# Patient Record
Sex: MALE | Race: OTHER | HISPANIC OR LATINO
[De-identification: names, ages, dates, MRNs, and addresses within clinical notes are randomized per-mention and may not be internally consistent; named-entity substitution may affect disease eponyms.]

---

## 2017-02-08 ENCOUNTER — APPOINTMENT (OUTPATIENT)
Dept: FAMILY MEDICINE | Facility: CLINIC | Age: 42
End: 2017-02-08

## 2017-02-08 VITALS
DIASTOLIC BLOOD PRESSURE: 81 MMHG | TEMPERATURE: 98.4 F | BODY MASS INDEX: 26.95 KG/M2 | SYSTOLIC BLOOD PRESSURE: 163 MMHG | HEART RATE: 89 BPM | WEIGHT: 210 LBS | HEIGHT: 74 IN | OXYGEN SATURATION: 97 %

## 2017-02-08 DIAGNOSIS — N52.9 MALE ERECTILE DYSFUNCTION, UNSPECIFIED: ICD-10-CM

## 2017-02-17 LAB — HBA1C MFR BLD HPLC: 6.6

## 2017-03-21 ENCOUNTER — EMERGENCY (EMERGENCY)
Facility: HOSPITAL | Age: 42
LOS: 1 days | Discharge: HOME | End: 2017-03-21
Attending: EMERGENCY MEDICINE | Admitting: EMERGENCY MEDICINE
Payer: MEDICARE

## 2017-03-21 VITALS
TEMPERATURE: 98 F | WEIGHT: 210.1 LBS | HEART RATE: 85 BPM | SYSTOLIC BLOOD PRESSURE: 136 MMHG | DIASTOLIC BLOOD PRESSURE: 87 MMHG | RESPIRATION RATE: 16 BRPM

## 2017-03-21 DIAGNOSIS — H53.8 OTHER VISUAL DISTURBANCES: ICD-10-CM

## 2017-03-21 DIAGNOSIS — R51 HEADACHE: ICD-10-CM

## 2017-03-21 DIAGNOSIS — E11.9 TYPE 2 DIABETES MELLITUS WITHOUT COMPLICATIONS: ICD-10-CM

## 2017-03-21 PROCEDURE — 70450 CT HEAD/BRAIN W/O DYE: CPT

## 2017-03-21 PROCEDURE — 99284 EMERGENCY DEPT VISIT MOD MDM: CPT | Mod: 25

## 2017-03-21 PROCEDURE — T1013: CPT

## 2017-03-21 PROCEDURE — 70450 CT HEAD/BRAIN W/O DYE: CPT | Mod: 26

## 2017-03-21 PROCEDURE — 99284 EMERGENCY DEPT VISIT MOD MDM: CPT

## 2017-03-21 PROCEDURE — 82962 GLUCOSE BLOOD TEST: CPT

## 2017-03-21 RX ORDER — ACETAMINOPHEN 500 MG
650 TABLET ORAL ONCE
Qty: 0 | Refills: 0 | Status: COMPLETED | OUTPATIENT
Start: 2017-03-21 | End: 2017-03-21

## 2017-03-21 RX ORDER — IBUPROFEN 200 MG
1 TABLET ORAL
Qty: 15 | Refills: 0 | OUTPATIENT
Start: 2017-03-21

## 2017-03-21 RX ADMIN — Medication 650 MILLIGRAM(S): at 14:51

## 2017-03-21 NOTE — ED STATDOCS - ATTENDING CONTRIBUTION TO CARE
I, Richmond Grover, performed the initial face to face bedside interview with this patient regarding history of present illness, review of symptoms and relevant past medical, social and family history.  I completed an independent physical examination.  I was the initial provider who evaluated this patient. I have signed out the follow up of any pending tests (i.e. labs, radiological studies) to the ACP.  I have communicated the patient’s plan of care and disposition with the ACP.  The history, relevant review of systems, past medical and surgical history, medical decision making, and physical examination was documented by the scribe in my presence and I attest to the accuracy of the documentation.

## 2017-03-21 NOTE — ED STATDOCS - PROGRESS NOTE DETAILS
pt complaining of HA for the past 4 days, states symptoms improved mildly with motrin, denies dizziness/visual changes/neck pain/CP/SOB. Pt denies smoking hx, admits to Zyprexa and Abilify use, and was placed on DM medication "due to those meds side effects."   Gen- NAD Chest- s1s2 Lungs- CTA b.l Abd- soft, NT, ND, Neuro- no neuro deficits pt re-assessed, NAD, results discussed with pt, advised to rest/stay hydrated, continue meds as directed, f.u with pcp/neuro in 2-3 days. if any symptoms worsen or any new symptoms occur, return to ED, pt verbalized understanding.

## 2017-03-21 NOTE — ED ADULT TRIAGE NOTE - CHIEF COMPLAINT QUOTE
patient is awake and oriented times 3, complains of a headache for the past 4 days, last took advil at 830 am, some relief

## 2017-03-21 NOTE — ED STATDOCS - OBJECTIVE STATEMENT
41 y/o male, h/o DM, presents c/o throbbing constant HA x 4 days. Reports gradual onset. Pt also notes he sees "black dots" since onset. Sx have been improving; currently 2/10 intensity. Denies fever, n/v. Denies any inciting trauma/fall. Pt has taken ibuprofen w/ short, temporary relief. Efrain Esqueda  utilized to obtain history.

## 2017-03-21 NOTE — ED STATDOCS - NS ED MD SCRIBE ATTENDING SCRIBE SECTIONS
REVIEW OF SYSTEMS/HISTORY OF PRESENT ILLNESS/DISPOSITION/PHYSICAL EXAM/VITAL SIGNS( Pullset)/HIV/PAST MEDICAL/SURGICAL/SOCIAL HISTORY

## 2017-04-07 ENCOUNTER — APPOINTMENT (OUTPATIENT)
Dept: FAMILY MEDICINE | Facility: CLINIC | Age: 42
End: 2017-04-07

## 2017-04-12 ENCOUNTER — APPOINTMENT (OUTPATIENT)
Dept: FAMILY MEDICINE | Facility: CLINIC | Age: 42
End: 2017-04-12

## 2017-04-12 VITALS
HEART RATE: 76 BPM | WEIGHT: 210 LBS | SYSTOLIC BLOOD PRESSURE: 140 MMHG | BODY MASS INDEX: 26.95 KG/M2 | OXYGEN SATURATION: 98 % | DIASTOLIC BLOOD PRESSURE: 85 MMHG | TEMPERATURE: 98.2 F | HEIGHT: 74 IN

## 2017-04-12 DIAGNOSIS — M79.1 MYALGIA: ICD-10-CM

## 2017-05-08 ENCOUNTER — APPOINTMENT (OUTPATIENT)
Dept: FAMILY MEDICINE | Facility: CLINIC | Age: 42
End: 2017-05-08

## 2017-06-05 ENCOUNTER — RX RENEWAL (OUTPATIENT)
Age: 42
End: 2017-06-05

## 2017-06-13 ENCOUNTER — EMERGENCY (EMERGENCY)
Facility: HOSPITAL | Age: 42
LOS: 1 days | Discharge: HOME | End: 2017-06-13
Attending: EMERGENCY MEDICINE
Payer: MEDICARE

## 2017-06-13 VITALS
WEIGHT: 199.96 LBS | TEMPERATURE: 99 F | HEIGHT: 74 IN | HEART RATE: 90 BPM | OXYGEN SATURATION: 98 % | RESPIRATION RATE: 16 BRPM | SYSTOLIC BLOOD PRESSURE: 132 MMHG | DIASTOLIC BLOOD PRESSURE: 79 MMHG

## 2017-06-13 VITALS
TEMPERATURE: 99 F | RESPIRATION RATE: 18 BRPM | SYSTOLIC BLOOD PRESSURE: 116 MMHG | DIASTOLIC BLOOD PRESSURE: 82 MMHG | HEART RATE: 90 BPM | OXYGEN SATURATION: 97 %

## 2017-06-13 PROCEDURE — 99283 EMERGENCY DEPT VISIT LOW MDM: CPT

## 2017-06-13 PROCEDURE — 99282 EMERGENCY DEPT VISIT SF MDM: CPT

## 2017-06-13 RX ORDER — HYDROCORTISONE 1 %
1 OINTMENT (GRAM) TOPICAL
Qty: 1 | Refills: 0 | OUTPATIENT
Start: 2017-06-13 | End: 2017-06-23

## 2017-06-13 NOTE — ED ADULT TRIAGE NOTE - CHIEF COMPLAINT QUOTE
Pt c/o redness, pain and itching to rash on right arm. States he noticed red valentine on his right arm approx 3 days ago and it slowly got worse.

## 2017-06-13 NOTE — ED STATDOCS - NS ED MD SCRIBE ATTENDING SCRIBE SECTIONS
PAST MEDICAL/SURGICAL/SOCIAL HISTORY/INTAKE ASSESSMENT/SCREENINGS/DISPOSITION/HIV/REVIEW OF SYSTEMS/VITAL SIGNS( Pullset)/PHYSICAL EXAM/HISTORY OF PRESENT ILLNESS

## 2017-06-13 NOTE — ED STATDOCS - CARE PLAN
Principal Discharge DX:	Contact dermatitis  Instructions for follow-up, activity and diet:	Use steroid ointment as prescribed three times a day.  Return immediately to the ER if your symptoms change or worsen.  Otherwise, follow-up with your doctor later this week for re-examination.

## 2017-06-13 NOTE — ED STATDOCS - OBJECTIVE STATEMENT
43 y/o M pt w/ PMHx of bipolar and schizophrenia presents to the ED c/o red rash to R arm and L shoulder x3 days. Pt describes his rash as itching. Pt states that he was working around chemicals while at work the day before his rash began. Pt has been using Hydrocortisone (once a day) since yesterday to no relief. Pt denies fever, chills, throat swelling, or any other complaints.

## 2017-06-13 NOTE — ED STATDOCS - PLAN OF CARE
Use steroid ointment as prescribed three times a day.  Return immediately to the ER if your symptoms change or worsen.  Otherwise, follow-up with your doctor later this week for re-examination.

## 2017-06-13 NOTE — ED STATDOCS - SKIN, MLM
well circumscribed erythematous plaques to R antecubital fossa, back and L side of neck , no secondary drainage. Patches vary in size from a few mm to 4x6 cm well circumscribed erythematous patches and plaques with secondary scaling to R antecubital fossa, back and L side of neck , no secondary signs of infection/ honey crusting. Patches vary in size from a few mm to 4x6 cm

## 2017-06-23 ENCOUNTER — APPOINTMENT (OUTPATIENT)
Dept: FAMILY MEDICINE | Facility: CLINIC | Age: 42
End: 2017-06-23

## 2017-06-23 VITALS
OXYGEN SATURATION: 97 % | HEART RATE: 77 BPM | HEIGHT: 74 IN | DIASTOLIC BLOOD PRESSURE: 85 MMHG | WEIGHT: 210 LBS | BODY MASS INDEX: 26.95 KG/M2 | SYSTOLIC BLOOD PRESSURE: 126 MMHG | TEMPERATURE: 98.4 F

## 2017-06-23 LAB — HBA1C MFR BLD HPLC: 6.9

## 2017-07-12 ENCOUNTER — APPOINTMENT (OUTPATIENT)
Dept: FAMILY MEDICINE | Facility: CLINIC | Age: 42
End: 2017-07-12

## 2017-07-12 VITALS
BODY MASS INDEX: 26.71 KG/M2 | SYSTOLIC BLOOD PRESSURE: 128 MMHG | OXYGEN SATURATION: 99 % | TEMPERATURE: 98 F | DIASTOLIC BLOOD PRESSURE: 83 MMHG | WEIGHT: 208 LBS | HEART RATE: 71 BPM

## 2017-07-13 LAB
25(OH)D3 SERPL-MCNC: 42.4 NG/ML
ALBUMIN SERPL ELPH-MCNC: 4.5 G/DL
ALP BLD-CCNC: 61 U/L
ALT SERPL-CCNC: 17 U/L
ANION GAP SERPL CALC-SCNC: 14 MMOL/L
APPEARANCE: CLEAR
AST SERPL-CCNC: 20 U/L
BASOPHILS # BLD AUTO: 0.02 K/UL
BASOPHILS NFR BLD AUTO: 0.4 %
BILIRUB SERPL-MCNC: 0.3 MG/DL
BILIRUBIN URINE: NEGATIVE
BLOOD URINE: NEGATIVE
BUN SERPL-MCNC: 16 MG/DL
CALCIUM SERPL-MCNC: 10.3 MG/DL
CHLORIDE SERPL-SCNC: 99 MMOL/L
CHOLEST SERPL-MCNC: 205 MG/DL
CHOLEST/HDLC SERPL: 3.5 RATIO
CO2 SERPL-SCNC: 27 MMOL/L
COLOR: YELLOW
CREAT SERPL-MCNC: 1.02 MG/DL
EOSINOPHIL # BLD AUTO: 0.15 K/UL
EOSINOPHIL NFR BLD AUTO: 2.9 %
GLUCOSE QUALITATIVE U: 1000 MG/DL
GLUCOSE SERPL-MCNC: 123 MG/DL
HCT VFR BLD CALC: 47.5 %
HDLC SERPL-MCNC: 59 MG/DL
HGB BLD-MCNC: 16.1 G/DL
IMM GRANULOCYTES NFR BLD AUTO: 0.2 %
KETONES URINE: NEGATIVE
LDLC SERPL CALC-MCNC: 122 MG/DL
LEUKOCYTE ESTERASE URINE: NEGATIVE
LYMPHOCYTES # BLD AUTO: 2.16 K/UL
LYMPHOCYTES NFR BLD AUTO: 42.1 %
MAN DIFF?: NORMAL
MCHC RBC-ENTMCNC: 30.4 PG
MCHC RBC-ENTMCNC: 33.9 GM/DL
MCV RBC AUTO: 89.8 FL
MONOCYTES # BLD AUTO: 0.37 K/UL
MONOCYTES NFR BLD AUTO: 7.2 %
NEUTROPHILS # BLD AUTO: 2.42 K/UL
NEUTROPHILS NFR BLD AUTO: 47.2 %
NITRITE URINE: NEGATIVE
PH URINE: 7.5
PLATELET # BLD AUTO: 313 K/UL
POTASSIUM SERPL-SCNC: 4.3 MMOL/L
PROT SERPL-MCNC: 8.4 G/DL
PROTEIN URINE: NEGATIVE MG/DL
RBC # BLD: 5.29 M/UL
RBC # FLD: 14.1 %
SODIUM SERPL-SCNC: 140 MMOL/L
SPECIFIC GRAVITY URINE: 1.04
TRIGL SERPL-MCNC: 121 MG/DL
TSH SERPL-ACNC: 2.99 UIU/ML
UROBILINOGEN URINE: NORMAL MG/DL
WBC # FLD AUTO: 5.13 K/UL

## 2017-09-25 ENCOUNTER — RX RENEWAL (OUTPATIENT)
Age: 42
End: 2017-09-25

## 2017-10-16 ENCOUNTER — APPOINTMENT (OUTPATIENT)
Dept: FAMILY MEDICINE | Facility: CLINIC | Age: 42
End: 2017-10-16
Payer: MEDICARE

## 2017-10-16 VITALS
HEIGHT: 74 IN | DIASTOLIC BLOOD PRESSURE: 88 MMHG | BODY MASS INDEX: 25.93 KG/M2 | WEIGHT: 202 LBS | TEMPERATURE: 98.1 F | HEART RATE: 73 BPM | SYSTOLIC BLOOD PRESSURE: 143 MMHG | OXYGEN SATURATION: 99 %

## 2017-10-16 PROCEDURE — 83036 HEMOGLOBIN GLYCOSYLATED A1C: CPT | Mod: QW

## 2017-10-16 PROCEDURE — 99213 OFFICE O/P EST LOW 20 MIN: CPT | Mod: 25

## 2017-10-17 LAB — HBA1C MFR BLD HPLC: 6.4

## 2017-11-30 NOTE — ED ADULT TRIAGE NOTE - NS ED NURSE DIRECT TO ROOM YN

## 2018-01-22 ENCOUNTER — RX RENEWAL (OUTPATIENT)
Age: 43
End: 2018-01-22

## 2018-02-15 ENCOUNTER — RX RENEWAL (OUTPATIENT)
Age: 43
End: 2018-02-15

## 2018-03-05 ENCOUNTER — APPOINTMENT (OUTPATIENT)
Dept: FAMILY MEDICINE | Facility: CLINIC | Age: 43
End: 2018-03-05
Payer: MEDICARE

## 2018-03-05 VITALS
HEART RATE: 76 BPM | BODY MASS INDEX: 27.21 KG/M2 | DIASTOLIC BLOOD PRESSURE: 84 MMHG | HEIGHT: 74 IN | SYSTOLIC BLOOD PRESSURE: 150 MMHG | WEIGHT: 212 LBS | TEMPERATURE: 97.9 F | OXYGEN SATURATION: 100 %

## 2018-03-05 PROCEDURE — 83036 HEMOGLOBIN GLYCOSYLATED A1C: CPT | Mod: QW

## 2018-03-05 PROCEDURE — 99214 OFFICE O/P EST MOD 30 MIN: CPT | Mod: 25

## 2018-03-07 LAB — HBA1C MFR BLD HPLC: 7.7

## 2018-05-29 ENCOUNTER — RX RENEWAL (OUTPATIENT)
Age: 43
End: 2018-05-29

## 2018-06-04 ENCOUNTER — APPOINTMENT (OUTPATIENT)
Dept: FAMILY MEDICINE | Facility: CLINIC | Age: 43
End: 2018-06-04
Payer: MEDICARE

## 2018-06-04 VITALS
DIASTOLIC BLOOD PRESSURE: 84 MMHG | HEART RATE: 78 BPM | WEIGHT: 217 LBS | OXYGEN SATURATION: 98 % | BODY MASS INDEX: 27.85 KG/M2 | TEMPERATURE: 98.2 F | SYSTOLIC BLOOD PRESSURE: 133 MMHG | HEIGHT: 74 IN

## 2018-06-04 DIAGNOSIS — Z76.0 ENCOUNTER FOR ISSUE OF REPEAT PRESCRIPTION: ICD-10-CM

## 2018-06-04 PROCEDURE — 99214 OFFICE O/P EST MOD 30 MIN: CPT | Mod: 25

## 2018-06-04 PROCEDURE — 83036 HEMOGLOBIN GLYCOSYLATED A1C: CPT | Mod: QW

## 2018-06-04 NOTE — ASSESSMENT
[FreeTextEntry1] : -DM: D/C Invokana due to afraid of side effects and commercial advertisement.. Continue metformin. HbA1c:8.6%.Start glimepiride 1 mg daily. Risks and benefits d/w pt.\par \par -Schizophrenia; on Abilify and Zyprexa. f/up by Psychiatry.\par \par --CPE:up to date. will schedule appointment for next visit.\par

## 2018-06-04 NOTE — HISTORY OF PRESENT ILLNESS
[FreeTextEntry1] : DM f/up [de-identified] : Pt w/ DM, Schizophrenia, presents today for f/up.\par \par Seen by psychiatry, dr Gutierrez regularly.recenlty change to  MD (Can't recall name). \par reports to feel better.\par \par States was not 100% compliant w/ meds. Afraid of adverse effects.\par

## 2018-06-13 ENCOUNTER — APPOINTMENT (OUTPATIENT)
Dept: FAMILY MEDICINE | Facility: CLINIC | Age: 43
End: 2018-06-13
Payer: MEDICARE

## 2018-06-13 VITALS
TEMPERATURE: 98.3 F | HEART RATE: 79 BPM | WEIGHT: 216 LBS | HEIGHT: 74 IN | BODY MASS INDEX: 27.72 KG/M2 | SYSTOLIC BLOOD PRESSURE: 135 MMHG | OXYGEN SATURATION: 97 % | DIASTOLIC BLOOD PRESSURE: 85 MMHG

## 2018-06-13 PROCEDURE — 82962 GLUCOSE BLOOD TEST: CPT

## 2018-06-13 PROCEDURE — 99214 OFFICE O/P EST MOD 30 MIN: CPT | Mod: 25

## 2018-06-13 NOTE — HISTORY OF PRESENT ILLNESS
[FreeTextEntry8] : weakness in knees. States that yesterday left work earlier due to pain and weakness in both knees.\par denies numbness or tingling in extremities.\par States symptoms are presents since 4 days ago. yesterday symptoms where stronger than previous days. and weakness is permanent.\par \par reports d/c Psychiatric meds since 4 days ago. States now is going to see his psychiatry at [facility]. Insist those are the medication that are ruining him. States feel like zombie all the time. \par Now feel his thoughts are more clear without Psych meds.

## 2018-06-13 NOTE — ASSESSMENT
[FreeTextEntry1] : -DM: D/C Invokana due to afraid of side effects and commercial advertisement.. Continue metformin. HbA1c:8.6%.(6/2018) On glimepiride 1 mg daily. Risks and benefits d/w pt. Glucose: 151\par \par -Schizophrenia; D/C Abilify and Zyprexa. f/up by Psychiatry.today.\par \par --CPE:up to date. will schedule appointment for next visit.\par \par -Weakness both knees: R/O OA. Xray order.\par

## 2018-08-25 ENCOUNTER — EMERGENCY (EMERGENCY)
Facility: HOSPITAL | Age: 43
LOS: 1 days | Discharge: HOME | End: 2018-08-25
Attending: EMERGENCY MEDICINE
Payer: SELF-PAY

## 2018-08-25 VITALS
OXYGEN SATURATION: 99 % | HEART RATE: 69 BPM | TEMPERATURE: 98 F | RESPIRATION RATE: 17 BRPM | SYSTOLIC BLOOD PRESSURE: 129 MMHG | DIASTOLIC BLOOD PRESSURE: 88 MMHG

## 2018-08-25 VITALS
DIASTOLIC BLOOD PRESSURE: 80 MMHG | WEIGHT: 210.1 LBS | HEIGHT: 74 IN | SYSTOLIC BLOOD PRESSURE: 128 MMHG | TEMPERATURE: 99 F | HEART RATE: 83 BPM | OXYGEN SATURATION: 100 % | RESPIRATION RATE: 18 BRPM

## 2018-08-25 DIAGNOSIS — R69 ILLNESS, UNSPECIFIED: ICD-10-CM

## 2018-08-25 DIAGNOSIS — F51.05 INSOMNIA DUE TO OTHER MENTAL DISORDER: ICD-10-CM

## 2018-08-25 DIAGNOSIS — F31.9 BIPOLAR DISORDER, UNSPECIFIED: ICD-10-CM

## 2018-08-25 LAB
ALBUMIN SERPL ELPH-MCNC: 3.9 G/DL — SIGNIFICANT CHANGE UP (ref 3.3–5.2)
ALP SERPL-CCNC: 65 U/L — SIGNIFICANT CHANGE UP (ref 40–120)
ALT FLD-CCNC: 29 U/L — SIGNIFICANT CHANGE UP
AMPHET UR-MCNC: NEGATIVE — SIGNIFICANT CHANGE UP
ANION GAP SERPL CALC-SCNC: 12 MMOL/L — SIGNIFICANT CHANGE UP (ref 5–17)
APAP SERPL-MCNC: <7.5 UG/ML — LOW (ref 10–26)
APPEARANCE UR: CLEAR — SIGNIFICANT CHANGE UP
AST SERPL-CCNC: 24 U/L — SIGNIFICANT CHANGE UP
BARBITURATES UR SCN-MCNC: NEGATIVE — SIGNIFICANT CHANGE UP
BASOPHILS # BLD AUTO: 0 K/UL — SIGNIFICANT CHANGE UP (ref 0–0.2)
BASOPHILS NFR BLD AUTO: 0.6 % — SIGNIFICANT CHANGE UP (ref 0–2)
BENZODIAZ UR-MCNC: NEGATIVE — SIGNIFICANT CHANGE UP
BILIRUB SERPL-MCNC: <0.2 MG/DL — LOW (ref 0.4–2)
BILIRUB UR-MCNC: NEGATIVE — SIGNIFICANT CHANGE UP
BUN SERPL-MCNC: 21 MG/DL — HIGH (ref 8–20)
CALCIUM SERPL-MCNC: 9 MG/DL — SIGNIFICANT CHANGE UP (ref 8.6–10.2)
CHLORIDE SERPL-SCNC: 102 MMOL/L — SIGNIFICANT CHANGE UP (ref 98–107)
CO2 SERPL-SCNC: 25 MMOL/L — SIGNIFICANT CHANGE UP (ref 22–29)
COCAINE METAB.OTHER UR-MCNC: NEGATIVE — SIGNIFICANT CHANGE UP
COLOR SPEC: YELLOW — SIGNIFICANT CHANGE UP
CREAT SERPL-MCNC: 0.76 MG/DL — SIGNIFICANT CHANGE UP (ref 0.5–1.3)
DIFF PNL FLD: NEGATIVE — SIGNIFICANT CHANGE UP
EOSINOPHIL # BLD AUTO: 0.2 K/UL — SIGNIFICANT CHANGE UP (ref 0–0.5)
EOSINOPHIL NFR BLD AUTO: 4.1 % — SIGNIFICANT CHANGE UP (ref 0–5)
EPI CELLS # UR: SIGNIFICANT CHANGE UP
ETHANOL SERPL-MCNC: <10 MG/DL — SIGNIFICANT CHANGE UP
GLUCOSE SERPL-MCNC: 227 MG/DL — HIGH (ref 70–115)
GLUCOSE UR QL: 250 MG/DL
HCT VFR BLD CALC: 40.1 % — LOW (ref 42–52)
HGB BLD-MCNC: 13.6 G/DL — LOW (ref 14–18)
KETONES UR-MCNC: ABNORMAL
LEUKOCYTE ESTERASE UR-ACNC: NEGATIVE — SIGNIFICANT CHANGE UP
LYMPHOCYTES # BLD AUTO: 2.1 K/UL — SIGNIFICANT CHANGE UP (ref 1–4.8)
LYMPHOCYTES # BLD AUTO: 42.5 % — SIGNIFICANT CHANGE UP (ref 20–55)
MCHC RBC-ENTMCNC: 30.4 PG — SIGNIFICANT CHANGE UP (ref 27–31)
MCHC RBC-ENTMCNC: 33.9 G/DL — SIGNIFICANT CHANGE UP (ref 32–36)
MCV RBC AUTO: 89.5 FL — SIGNIFICANT CHANGE UP (ref 80–94)
METHADONE UR-MCNC: NEGATIVE — SIGNIFICANT CHANGE UP
MONOCYTES # BLD AUTO: 0.5 K/UL — SIGNIFICANT CHANGE UP (ref 0–0.8)
MONOCYTES NFR BLD AUTO: 10 % — SIGNIFICANT CHANGE UP (ref 3–10)
NEUTROPHILS # BLD AUTO: 2.1 K/UL — SIGNIFICANT CHANGE UP (ref 1.8–8)
NEUTROPHILS NFR BLD AUTO: 42.6 % — SIGNIFICANT CHANGE UP (ref 37–73)
NITRITE UR-MCNC: NEGATIVE — SIGNIFICANT CHANGE UP
OPIATES UR-MCNC: NEGATIVE — SIGNIFICANT CHANGE UP
PCP SPEC-MCNC: SIGNIFICANT CHANGE UP
PCP UR-MCNC: NEGATIVE — SIGNIFICANT CHANGE UP
PH UR: 8 — SIGNIFICANT CHANGE UP (ref 5–8)
PLATELET # BLD AUTO: 301 K/UL — SIGNIFICANT CHANGE UP (ref 150–400)
POTASSIUM SERPL-MCNC: 3.9 MMOL/L — SIGNIFICANT CHANGE UP (ref 3.5–5.3)
POTASSIUM SERPL-SCNC: 3.9 MMOL/L — SIGNIFICANT CHANGE UP (ref 3.5–5.3)
PROT SERPL-MCNC: 6.9 G/DL — SIGNIFICANT CHANGE UP (ref 6.6–8.7)
PROT UR-MCNC: 15 MG/DL
RBC # BLD: 4.48 M/UL — LOW (ref 4.6–6.2)
RBC # FLD: 13.6 % — SIGNIFICANT CHANGE UP (ref 11–15.6)
RBC CASTS # UR COMP ASSIST: NEGATIVE /HPF — SIGNIFICANT CHANGE UP (ref 0–4)
SALICYLATES SERPL-MCNC: <0.6 MG/DL — LOW (ref 10–20)
SODIUM SERPL-SCNC: 139 MMOL/L — SIGNIFICANT CHANGE UP (ref 135–145)
SP GR SPEC: 1.01 — SIGNIFICANT CHANGE UP (ref 1.01–1.02)
THC UR QL: NEGATIVE — SIGNIFICANT CHANGE UP
TSH SERPL-MCNC: 4.8 UIU/ML — HIGH (ref 0.27–4.2)
UROBILINOGEN FLD QL: NEGATIVE MG/DL — SIGNIFICANT CHANGE UP
WBC # BLD: 4.9 K/UL — SIGNIFICANT CHANGE UP (ref 4.8–10.8)
WBC # FLD AUTO: 4.9 K/UL — SIGNIFICANT CHANGE UP (ref 4.8–10.8)
WBC UR QL: NEGATIVE — SIGNIFICANT CHANGE UP

## 2018-08-25 PROCEDURE — 84443 ASSAY THYROID STIM HORMONE: CPT

## 2018-08-25 PROCEDURE — 93010 ELECTROCARDIOGRAM REPORT: CPT

## 2018-08-25 PROCEDURE — 99053 MED SERV 10PM-8AM 24 HR FAC: CPT

## 2018-08-25 PROCEDURE — 80307 DRUG TEST PRSMV CHEM ANLYZR: CPT

## 2018-08-25 PROCEDURE — 81001 URINALYSIS AUTO W/SCOPE: CPT

## 2018-08-25 PROCEDURE — 99284 EMERGENCY DEPT VISIT MOD MDM: CPT

## 2018-08-25 PROCEDURE — T1013: CPT

## 2018-08-25 PROCEDURE — 85027 COMPLETE CBC AUTOMATED: CPT

## 2018-08-25 PROCEDURE — 36415 COLL VENOUS BLD VENIPUNCTURE: CPT

## 2018-08-25 PROCEDURE — 80053 COMPREHEN METABOLIC PANEL: CPT

## 2018-08-25 PROCEDURE — 82962 GLUCOSE BLOOD TEST: CPT

## 2018-08-25 PROCEDURE — 99284 EMERGENCY DEPT VISIT MOD MDM: CPT | Mod: 25

## 2018-08-25 RX ORDER — METFORMIN HYDROCHLORIDE 850 MG/1
1000 TABLET ORAL ONCE
Qty: 0 | Refills: 0 | Status: COMPLETED | OUTPATIENT
Start: 2018-08-25 | End: 2018-08-25

## 2018-08-25 RX ORDER — OLANZAPINE 15 MG/1
0 TABLET, FILM COATED ORAL
Qty: 0 | Refills: 0 | COMMUNITY

## 2018-08-25 RX ORDER — OLANZAPINE 15 MG/1
1 TABLET, FILM COATED ORAL
Qty: 0 | Refills: 0 | COMMUNITY

## 2018-08-25 RX ORDER — ZOLPIDEM TARTRATE 10 MG/1
1 TABLET ORAL
Qty: 30 | Refills: 0
Start: 2018-08-25

## 2018-08-25 RX ORDER — OLANZAPINE 15 MG/1
5 TABLET, FILM COATED ORAL
Qty: 30 | Refills: 0
Start: 2018-08-25 | End: 2018-09-23

## 2018-08-25 RX ADMIN — METFORMIN HYDROCHLORIDE 1000 MILLIGRAM(S): 850 TABLET ORAL at 08:32

## 2018-08-25 NOTE — ED BEHAVIORAL HEALTH ASSESSMENT NOTE - SUICIDE PROTECTIVE FACTORS
Responsibility to family and others/Identifies reasons for living/Supportive social network or family/High spirituality

## 2018-08-25 NOTE — ED BEHAVIORAL HEALTH ASSESSMENT NOTE - HPI (INCLUDE ILLNESS QUALITY, SEVERITY, DURATION, TIMING, CONTEXT, MODIFYING FACTORS, ASSOCIATED SIGNS AND SYMPTOMS)
Over last month both pt and wife report poor sleep racing thoughts episodes of verbal outbursts "In frustration" but no suicidal or violent behaviors Appetite is good No evidence of psychotic component Compliant with medications generally When more stable he is sometimes not taking them per his wife Patient feels recent increase in Zyprexa from 5 mg to 10 mg made sleep worse He feels exhausted but only dozes briefly Wife corroborates this account In past when admitted to [facility] took Ambien with good effect  No reported drug or alcohol use   Wife was asking for admission to [facility] but confirmed there were no symptoms to warrant involuntary admission  I-Stop query done

## 2018-08-25 NOTE — ED ADULT NURSE NOTE - CHIEF COMPLAINT QUOTE
"I've been having trouble sleeping for 30x days" "I want to see the psychiatrist to help me", c/o difficulty sleeping x30days reports taking olanzapine 10mg and aripiprazole 10mg with no relief. Denies hallucinations denies si/denies hi denies etoh denies drug use denies any pain denies sob denies any additional complaints. MAx4. RR even and unlabored. Cap refill <2sec, in no apparent distress @ this time

## 2018-08-25 NOTE — ED PROVIDER NOTE - MEDICAL DECISION MAKING DETAILS
Pt here with insomnia, r/o metabolic abnormality, requesting psychiatric evaluation, will be moved to

## 2018-08-25 NOTE — ED BEHAVIORAL HEALTH ASSESSMENT NOTE - SUMMARY
Patient with primary complaint of insomnia H/o bipolar disorder on polypharmacy with 2 aytipcal antipsychotics

## 2018-08-25 NOTE — ED ADULT NURSE REASSESSMENT NOTE - NS ED NURSE REASSESS COMMENT FT1
pt assigned room 4, unable to fall asleep, appears restless in room ambulating between common area and room.
Assumed care of patient at 0725.  Patient awake watching television in [facility] area.  Patient pleasant on approach.  Patient denies suicidal or homicidal ideations and psychotic symptoms.  Patient endorses he has been experiencing insomnia for 33 days and sleeps approximately 5 to 10 minutes at a time.  Patient educated about plan of care and pending consult which he verbalized understanding of.

## 2018-08-25 NOTE — ED PROVIDER NOTE - OBJECTIVE STATEMENT
42 y/o M pt with hx of bipolar disorder and schizophrenia presents to ED c/o difficulty sleeping for the past 33 days. Pt has taken Olanzapine 10mg and Aripiprazole 10mg, and is sometimes able to sleep 15-20 minutes then wakes up. Pt states he was seen in [place] and tried ECT with no relief. Denies SI, HI, auditory/visual hallucinations, CP, SOB, n/v/d, abdominal pain, and HA. No further complaints at this time.

## 2018-08-25 NOTE — ED ADULT NURSE NOTE - NSIMPLEMENTINTERV_GEN_ALL_ED
Implemented All Universal Safety Interventions:  [place] to call system. Call bell, personal items and telephone within reach. Instruct patient to call for assistance. Room bathroom lighting operational. Non-slip footwear when patient is off stretcher. Physically safe environment: no spills, clutter or unnecessary equipment. Stretcher in lowest position, wheels locked, appropriate side rails in place.

## 2018-08-25 NOTE — ED ADULT NURSE NOTE - OBJECTIVE STATEMENT
pt states he hasn't been able to sleep for 3 days, very limited but not much. pt with no other complaints.

## 2018-08-25 NOTE — ED BEHAVIORAL HEALTH ASSESSMENT NOTE - DESCRIPTION
DM type 2 disable Born in [place] pleasant talkative polite  Vital Signs Last 24 Hrs  T(C): 36.6 (25 Aug 2018 07:45), Max: 37.1 (25 Aug 2018 01:36)  T(F): 97.9 (25 Aug 2018 07:45), Max: 98.7 (25 Aug 2018 01:36)  HR: 67 (25 Aug 2018 07:45) (67 - 83)  BP: 119/80 (25 Aug 2018 07:45) (109/75 - 128/80)  BP(mean): --  RR: 17 (25 Aug 2018 07:45) (17 - 19)  SpO2: 100% (25 Aug 2018 07:45) (99% - 100%)

## 2018-08-25 NOTE — ED PROVIDER NOTE - PROGRESS NOTE DETAILS
makayla: pt signed out to me; medically cleared was in  for insomnia/bipolar---now cleared by  ok to dc

## 2018-08-25 NOTE — ED ADULT TRIAGE NOTE - CHIEF COMPLAINT QUOTE
"I've been having trouble sleeping for 30x days" "I want to see the psychiatrist to help me", c/o difficulty sleeping x30days reports taking olanzapine 10mg and aripiprazole 10mg with no relief. Denies hallucinations denies si/denies hi denies any pain denies sob denies any additional complaints. MAx4. RR even and unlabored. Cap refill <2sec, in no apparent distress @ this time "I've been having trouble sleeping for 30x days" "I want to see the psychiatrist to help me", c/o difficulty sleeping x30days reports taking olanzapine 10mg and aripiprazole 10mg with no relief. Denies hallucinations denies si/denies hi denies etoh denies drug use denies any pain denies sob denies any additional complaints. MAx4. RR even and unlabored. Cap refill <2sec, in no apparent distress @ this time

## 2018-08-25 NOTE — ED ADULT NURSE NOTE - HPI (INCLUDE ILLNESS QUALITY, SEVERITY, DURATION, TIMING, CONTEXT, MODIFYING FACTORS, ASSOCIATED SIGNS AND SYMPTOMS)
pt received medically cleared by ED attending, pt A&o x4 c/o insomnia for 33 days, pt taking zyprexa & abilify at hs but not reliving insomnia. pt presents with a full affect, calm and cooperative, denies SI, HI, paranoia, or AVH. Pt shows no sx of veena. pt requesting that Dr. Cloud review his medications for improvement of insomnia. Pt maintains a full time job.

## 2018-09-05 ENCOUNTER — APPOINTMENT (OUTPATIENT)
Dept: FAMILY MEDICINE | Facility: CLINIC | Age: 43
End: 2018-09-05
Payer: MEDICARE

## 2018-09-05 VITALS
TEMPERATURE: 98.3 F | OXYGEN SATURATION: 98 % | HEART RATE: 80 BPM | HEIGHT: 74 IN | SYSTOLIC BLOOD PRESSURE: 137 MMHG | BODY MASS INDEX: 27.21 KG/M2 | WEIGHT: 212 LBS | DIASTOLIC BLOOD PRESSURE: 85 MMHG

## 2018-09-05 PROBLEM — E11.9 TYPE 2 DIABETES MELLITUS WITHOUT COMPLICATIONS: Chronic | Status: ACTIVE | Noted: 2018-08-25

## 2018-09-05 LAB — HBA1C MFR BLD HPLC: 8.3

## 2018-09-05 PROCEDURE — G0444 DEPRESSION SCREEN ANNUAL: CPT | Mod: 59

## 2018-09-05 PROCEDURE — G0439: CPT

## 2018-09-05 PROCEDURE — 36415 COLL VENOUS BLD VENIPUNCTURE: CPT

## 2018-09-05 PROCEDURE — 83036 HEMOGLOBIN GLYCOSYLATED A1C: CPT | Mod: QW

## 2018-09-05 RX ORDER — GLIMEPIRIDE 1 MG/1
1 TABLET ORAL DAILY
Qty: 90 | Refills: 0 | Status: DISCONTINUED | COMMUNITY
Start: 2018-03-05 | End: 2018-09-05

## 2018-09-05 NOTE — ASSESSMENT
[FreeTextEntry1] : -CPE: blood and UA done today.\par \par -HIV: screening 2016.\par \par -DM: Not well control. Continue meds.HbA1c :8.3%. Continue metformin. Start januvia. Intolerant to amaryl and refused Invokana.\par \par -Schizophrenia: continue meds and f/up w/ psychiatry Dr Magallon at [facility].\par \par -Dyslipidemia: Lipid profile today. pt refused meds in the past.\par \par -Podiatry: seen 4 months ago\par \par -opthalmology: seen < 1 year ago. \par \par

## 2018-09-05 NOTE — HISTORY OF PRESENT ILLNESS
[FreeTextEntry1] : Annual physical [de-identified] : Pt w/ DM, Schizophrenia, presents today for annual physical.\par \par Pt states that d/c invokana aprox 3 weks , ago, due to commercial advertisement and States  contacts him by phone.Did not tolerate amaryl.\par \par Seen by psychiatry, dr Magallon regularly. \par  \par

## 2018-09-05 NOTE — HEALTH RISK ASSESSMENT
[Good] : ~his/her~  mood as  good [No falls in past year] : Patient reported no falls in the past year [0] : 2) Feeling down, depressed, or hopeless: Not at all (0) [HIV test declined] : HIV test declined [Hepatitis C test declined] : Hepatitis C test declined [None] : None [With Family] : lives with family [On disability] : on disability [] :  [# Of Children ___] : has [unfilled] children [Sexually Active] : sexually active [Feels Safe at Home] : Feels safe at home [Fully functional (bathing, dressing, toileting, transferring, walking, feeding)] : Fully functional (bathing, dressing, toileting, transferring, walking, feeding) [Fully functional (using the telephone, shopping, preparing meals, housekeeping, doing laundry, using] : Fully functional and needs no help or supervision to perform IADLs (using the telephone, shopping, preparing meals, housekeeping, doing laundry, using transportation, managing medications and managing finances) [Smoke Detector] : smoke detector [Seat Belt] :  uses seat belt [Discussed at today's visit] : Advance Directives Discussed at today's visit [Designated Healthcare Proxy] : Designated healthcare proxy [Name: ___] : Health Care Proxy's Name: [unfilled]  [Relationship: ___] : Relationship: [unfilled] [] : No [Change in mental status noted] : No change in mental status noted [Language] : denies difficulty with language [Handling Complex Tasks] : denies difficulty handling complex tasks [Reports changes in hearing] : Reports no changes in hearing [Reports changes in vision] : Reports no changes in vision [Reports changes in dental health] : Reports no changes in dental health [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

## 2018-09-11 LAB
25(OH)D3 SERPL-MCNC: 21.9 NG/ML
ALBUMIN SERPL ELPH-MCNC: 4.3 G/DL
ALP BLD-CCNC: 83 U/L
ALT SERPL-CCNC: 39 U/L
ANION GAP SERPL CALC-SCNC: 15 MMOL/L
APPEARANCE: CLEAR
AST SERPL-CCNC: 24 U/L
BASOPHILS # BLD AUTO: 0.03 K/UL
BASOPHILS NFR BLD AUTO: 0.5 %
BILIRUB SERPL-MCNC: <0.2 MG/DL
BILIRUBIN URINE: NEGATIVE
BLOOD URINE: NEGATIVE
BUN SERPL-MCNC: 18 MG/DL
CALCIUM SERPL-MCNC: 10 MG/DL
CHLORIDE SERPL-SCNC: 97 MMOL/L
CHOLEST SERPL-MCNC: 238 MG/DL
CHOLEST/HDLC SERPL: 5.3 RATIO
CO2 SERPL-SCNC: 24 MMOL/L
COLOR: YELLOW
CREAT SERPL-MCNC: 1.05 MG/DL
CREAT SPEC-SCNC: 79 MG/DL
EOSINOPHIL # BLD AUTO: 0.19 K/UL
EOSINOPHIL NFR BLD AUTO: 3.1 %
GLUCOSE QUALITATIVE U: 1000 MG/DL
GLUCOSE SERPL-MCNC: 299 MG/DL
HCT VFR BLD CALC: 45.5 %
HDLC SERPL-MCNC: 45 MG/DL
HGB BLD-MCNC: 14.6 G/DL
IMM GRANULOCYTES NFR BLD AUTO: 0.3 %
KETONES URINE: ABNORMAL
LDLC SERPL CALC-MCNC: NORMAL
LEUKOCYTE ESTERASE URINE: NEGATIVE
LYMPHOCYTES # BLD AUTO: 2.02 K/UL
LYMPHOCYTES NFR BLD AUTO: 33.3 %
MAN DIFF?: NORMAL
MCHC RBC-ENTMCNC: 29.7 PG
MCHC RBC-ENTMCNC: 32.1 GM/DL
MCV RBC AUTO: 92.7 FL
MICROALBUMIN 24H UR DL<=1MG/L-MCNC: <1.2 MG/DL
MICROALBUMIN/CREAT 24H UR-RTO: NORMAL
MONOCYTES # BLD AUTO: 0.43 K/UL
MONOCYTES NFR BLD AUTO: 7.1 %
NEUTROPHILS # BLD AUTO: 3.37 K/UL
NEUTROPHILS NFR BLD AUTO: 55.7 %
NITRITE URINE: NEGATIVE
PH URINE: 5.5
PLATELET # BLD AUTO: 281 K/UL
POTASSIUM SERPL-SCNC: 4.5 MMOL/L
PROT SERPL-MCNC: 7.6 G/DL
PROTEIN URINE: NEGATIVE MG/DL
RBC # BLD: 4.91 M/UL
RBC # FLD: 14.5 %
SODIUM SERPL-SCNC: 136 MMOL/L
SPECIFIC GRAVITY URINE: 1.03
TRIGL SERPL-MCNC: 491 MG/DL
TSH SERPL-ACNC: 4.65 UIU/ML
UROBILINOGEN URINE: NEGATIVE MG/DL
WBC # FLD AUTO: 6.06 K/UL

## 2018-12-06 ENCOUNTER — APPOINTMENT (OUTPATIENT)
Dept: FAMILY MEDICINE | Facility: CLINIC | Age: 43
End: 2018-12-06
Payer: MEDICARE

## 2018-12-06 VITALS
WEIGHT: 220 LBS | DIASTOLIC BLOOD PRESSURE: 86 MMHG | TEMPERATURE: 98.4 F | HEART RATE: 81 BPM | BODY MASS INDEX: 28.23 KG/M2 | OXYGEN SATURATION: 98 % | HEIGHT: 74 IN | SYSTOLIC BLOOD PRESSURE: 139 MMHG

## 2018-12-06 LAB — HBA1C MFR BLD HPLC: 8.6

## 2018-12-06 PROCEDURE — 99214 OFFICE O/P EST MOD 30 MIN: CPT | Mod: 25

## 2018-12-06 PROCEDURE — 83036 HEMOGLOBIN GLYCOSYLATED A1C: CPT | Mod: QW

## 2018-12-06 NOTE — ASSESSMENT
[FreeTextEntry1] : -CPE: 9/2018\par \par -HIV: screening 2016.\par \par -DM: Not well control. Continue meds.HbA1c :8.6%. Continue metformin. \par on januvia, increase to 100mg daily.. Intolerant to amaryl and Actos, refused Invokana.\par Diabetic education referral.\par \par -Schizophrenia: continue meds and f/up w/ psychiatry Dr Bazan at [facility].\par \par -Dyslipidemia: pt refused meds in the past.\par start atorvastatin 20 mg. Risks and benefits d/w pt.\par \par -Podiatry: seen 4 months ago\par \par -opthalmology: seen < 1 year ago. \par \par

## 2018-12-06 NOTE — HISTORY OF PRESENT ILLNESS
[FreeTextEntry1] : DM f/up [de-identified] : Pt w/ DM, Schizophrenia, presents today for f/up.\par \par Compliant w/ meds, not with diet.\par \par Seen by psychiatry, dr Gutierrez regularly.recently change to Hortensia Lennon MD (Can't recall name). \par reports to feel better.\par \par \par

## 2019-01-24 ENCOUNTER — EMERGENCY (EMERGENCY)
Facility: HOSPITAL | Age: 44
LOS: 1 days | Discharge: HOME | End: 2019-01-24
Attending: EMERGENCY MEDICINE
Payer: MEDICARE

## 2019-01-24 VITALS
HEIGHT: 74 IN | SYSTOLIC BLOOD PRESSURE: 159 MMHG | HEART RATE: 97 BPM | DIASTOLIC BLOOD PRESSURE: 85 MMHG | TEMPERATURE: 98 F | WEIGHT: 214.07 LBS | RESPIRATION RATE: 20 BRPM | OXYGEN SATURATION: 99 %

## 2019-01-24 VITALS
TEMPERATURE: 97 F | RESPIRATION RATE: 16 BRPM | HEART RATE: 65 BPM | SYSTOLIC BLOOD PRESSURE: 119 MMHG | DIASTOLIC BLOOD PRESSURE: 79 MMHG | OXYGEN SATURATION: 98 %

## 2019-01-24 LAB
ACETONE SERPL-MCNC: NEGATIVE — SIGNIFICANT CHANGE UP
ALBUMIN SERPL ELPH-MCNC: 4.4 G/DL — SIGNIFICANT CHANGE UP (ref 3.3–5.2)
ALP SERPL-CCNC: 90 U/L — SIGNIFICANT CHANGE UP (ref 40–120)
ALT FLD-CCNC: 57 U/L — HIGH
ANION GAP SERPL CALC-SCNC: 15 MMOL/L — SIGNIFICANT CHANGE UP (ref 5–17)
APTT BLD: 28.4 SEC — SIGNIFICANT CHANGE UP (ref 27.5–36.3)
AST SERPL-CCNC: 32 U/L — SIGNIFICANT CHANGE UP
BASOPHILS # BLD AUTO: 0 K/UL — SIGNIFICANT CHANGE UP (ref 0–0.2)
BASOPHILS NFR BLD AUTO: 0.3 % — SIGNIFICANT CHANGE UP (ref 0–2)
BILIRUB SERPL-MCNC: 0.3 MG/DL — LOW (ref 0.4–2)
BUN SERPL-MCNC: 14 MG/DL — SIGNIFICANT CHANGE UP (ref 8–20)
CALCIUM SERPL-MCNC: 9.1 MG/DL — SIGNIFICANT CHANGE UP (ref 8.6–10.2)
CHLORIDE SERPL-SCNC: 98 MMOL/L — SIGNIFICANT CHANGE UP (ref 98–107)
CO2 SERPL-SCNC: 25 MMOL/L — SIGNIFICANT CHANGE UP (ref 22–29)
CREAT SERPL-MCNC: 0.86 MG/DL — SIGNIFICANT CHANGE UP (ref 0.5–1.3)
EOSINOPHIL # BLD AUTO: 0.1 K/UL — SIGNIFICANT CHANGE UP (ref 0–0.5)
EOSINOPHIL NFR BLD AUTO: 1.4 % — SIGNIFICANT CHANGE UP (ref 0–6)
GAS PNL BLDV: SIGNIFICANT CHANGE UP
GLUCOSE SERPL-MCNC: 360 MG/DL — HIGH (ref 70–115)
HCO3 BLDV-SCNC: 25 MMOL/L — SIGNIFICANT CHANGE UP (ref 20–26)
HCT VFR BLD CALC: 42.1 % — SIGNIFICANT CHANGE UP (ref 42–52)
HGB BLD-MCNC: 13.9 G/DL — LOW (ref 14–18)
INR BLD: 1.05 RATIO — SIGNIFICANT CHANGE UP (ref 0.88–1.16)
LYMPHOCYTES # BLD AUTO: 2.4 K/UL — SIGNIFICANT CHANGE UP (ref 1–4.8)
LYMPHOCYTES # BLD AUTO: 32.7 % — SIGNIFICANT CHANGE UP (ref 20–55)
MCHC RBC-ENTMCNC: 28.6 PG — SIGNIFICANT CHANGE UP (ref 27–31)
MCHC RBC-ENTMCNC: 33 G/DL — SIGNIFICANT CHANGE UP (ref 32–36)
MCV RBC AUTO: 86.6 FL — SIGNIFICANT CHANGE UP (ref 80–94)
MONOCYTES # BLD AUTO: 0.5 K/UL — SIGNIFICANT CHANGE UP (ref 0–0.8)
MONOCYTES NFR BLD AUTO: 7.1 % — SIGNIFICANT CHANGE UP (ref 3–10)
NEUTROPHILS # BLD AUTO: 4.2 K/UL — SIGNIFICANT CHANGE UP (ref 1.8–8)
NEUTROPHILS NFR BLD AUTO: 58.2 % — SIGNIFICANT CHANGE UP (ref 37–73)
PCO2 BLDV: 54 MMHG — HIGH (ref 35–50)
PH BLDV: 7.32 — SIGNIFICANT CHANGE UP (ref 7.32–7.43)
PLATELET # BLD AUTO: 273 K/UL — SIGNIFICANT CHANGE UP (ref 150–400)
PO2 BLDV: 52 MMHG — HIGH (ref 25–45)
POTASSIUM SERPL-MCNC: 4 MMOL/L — SIGNIFICANT CHANGE UP (ref 3.5–5.3)
POTASSIUM SERPL-SCNC: 4 MMOL/L — SIGNIFICANT CHANGE UP (ref 3.5–5.3)
PROT SERPL-MCNC: 7.4 G/DL — SIGNIFICANT CHANGE UP (ref 6.6–8.7)
PROTHROM AB SERPL-ACNC: 12.1 SEC — SIGNIFICANT CHANGE UP (ref 10–12.9)
RBC # BLD: 4.86 M/UL — SIGNIFICANT CHANGE UP (ref 4.6–6.2)
RBC # FLD: 13.5 % — SIGNIFICANT CHANGE UP (ref 11–15.6)
SAO2 % BLDV: 85 % — SIGNIFICANT CHANGE UP
SODIUM SERPL-SCNC: 138 MMOL/L — SIGNIFICANT CHANGE UP (ref 135–145)
TROPONIN T SERPL-MCNC: <0.01 NG/ML — SIGNIFICANT CHANGE UP (ref 0–0.06)
WBC # BLD: 7.2 K/UL — SIGNIFICANT CHANGE UP (ref 4.8–10.8)
WBC # FLD AUTO: 7.2 K/UL — SIGNIFICANT CHANGE UP (ref 4.8–10.8)

## 2019-01-24 PROCEDURE — 93306 TTE W/DOPPLER COMPLETE: CPT | Mod: 26

## 2019-01-24 PROCEDURE — 93880 EXTRACRANIAL BILAT STUDY: CPT | Mod: 26

## 2019-01-24 PROCEDURE — 99218: CPT

## 2019-01-24 PROCEDURE — 70450 CT HEAD/BRAIN W/O DYE: CPT | Mod: 26

## 2019-01-24 PROCEDURE — 93010 ELECTROCARDIOGRAM REPORT: CPT

## 2019-01-24 RX ORDER — DEXTROSE 50 % IN WATER 50 %
25 SYRINGE (ML) INTRAVENOUS ONCE
Qty: 0 | Refills: 0 | Status: DISCONTINUED | OUTPATIENT
Start: 2019-01-24 | End: 2019-01-29

## 2019-01-24 RX ORDER — OLANZAPINE 15 MG/1
5 TABLET, FILM COATED ORAL AT BEDTIME
Qty: 0 | Refills: 0 | Status: DISCONTINUED | OUTPATIENT
Start: 2019-01-24 | End: 2019-01-29

## 2019-01-24 RX ORDER — SODIUM CHLORIDE 9 MG/ML
1000 INJECTION INTRAMUSCULAR; INTRAVENOUS; SUBCUTANEOUS ONCE
Qty: 0 | Refills: 0 | Status: COMPLETED | OUTPATIENT
Start: 2019-01-24 | End: 2019-01-24

## 2019-01-24 RX ORDER — METFORMIN HYDROCHLORIDE 850 MG/1
1000 TABLET ORAL
Qty: 0 | Refills: 0 | Status: DISCONTINUED | OUTPATIENT
Start: 2019-01-24 | End: 2019-01-29

## 2019-01-24 RX ORDER — SODIUM CHLORIDE 9 MG/ML
1000 INJECTION, SOLUTION INTRAVENOUS
Qty: 0 | Refills: 0 | Status: DISCONTINUED | OUTPATIENT
Start: 2019-01-24 | End: 2019-01-29

## 2019-01-24 RX ORDER — DEXTROSE 50 % IN WATER 50 %
15 SYRINGE (ML) INTRAVENOUS ONCE
Qty: 0 | Refills: 0 | Status: DISCONTINUED | OUTPATIENT
Start: 2019-01-24 | End: 2019-01-29

## 2019-01-24 RX ORDER — DEXTROSE 50 % IN WATER 50 %
12.5 SYRINGE (ML) INTRAVENOUS ONCE
Qty: 0 | Refills: 0 | Status: DISCONTINUED | OUTPATIENT
Start: 2019-01-24 | End: 2019-01-29

## 2019-01-24 RX ORDER — GLUCAGON INJECTION, SOLUTION 0.5 MG/.1ML
1 INJECTION, SOLUTION SUBCUTANEOUS ONCE
Qty: 0 | Refills: 0 | Status: DISCONTINUED | OUTPATIENT
Start: 2019-01-24 | End: 2019-01-29

## 2019-01-24 RX ORDER — ARIPIPRAZOLE 15 MG/1
10 TABLET ORAL DAILY
Qty: 0 | Refills: 0 | Status: DISCONTINUED | OUTPATIENT
Start: 2019-01-25 | End: 2019-01-29

## 2019-01-24 RX ORDER — ATORVASTATIN CALCIUM 80 MG/1
20 TABLET, FILM COATED ORAL AT BEDTIME
Qty: 0 | Refills: 0 | Status: DISCONTINUED | OUTPATIENT
Start: 2019-01-24 | End: 2019-01-29

## 2019-01-24 RX ADMIN — METFORMIN HYDROCHLORIDE 1000 MILLIGRAM(S): 850 TABLET ORAL at 17:18

## 2019-01-24 RX ADMIN — SODIUM CHLORIDE 1000 MILLILITER(S): 9 INJECTION INTRAMUSCULAR; INTRAVENOUS; SUBCUTANEOUS at 12:09

## 2019-01-24 RX ADMIN — OLANZAPINE 5 MILLIGRAM(S): 15 TABLET, FILM COATED ORAL at 21:00

## 2019-01-24 RX ADMIN — ATORVASTATIN CALCIUM 20 MILLIGRAM(S): 80 TABLET, FILM COATED ORAL at 21:00

## 2019-01-24 NOTE — ED ADULT NURSE REASSESSMENT NOTE - NS ED NURSE REASSESS COMMENT FT1
Assumed care of the patient at 1400. Patient A&Ox3. No s/s of distress. States had episode of Left side of face numbness and left hand numbness lasting for about an hour today while driving. Now all symptoms resolved. Patient states he recently started on new medication Januvia for DM. NIH now 0, no deficits noted. Patient ambulatory. Placed on Cardiac monitor. To be taken for echo test (ok per PA to be off monitor for the test). Will continue to monitor.

## 2019-01-24 NOTE — ED CDU PROVIDER INITIAL DAY NOTE - ATTENDING CONTRIBUTION TO CARE
43 yo male pmh dm , bipolar comes to ed with numbness of left upper extremity lasting 1 hours; pt presently asymptomatic;  pt to be admitted to observation for evaluation of tia;  pe neuro awake alert in nad; Alert, lucid, and in no apparent distress. Pt is normocephalic, atraumatic.  Pupils are equal, round,   Lungs clear to ausultation. Heart regular rate and rhythm, normal S1, S2,   Abdomen is soft, nontender,  Non-focal sensory, 5 out of 5 motor strength, no dysmetria, fluent, goal directed speech. CN2 to 12 intact.   dx tia;  mri of head, carotid dopplers and echocardiogram

## 2019-01-24 NOTE — ED ADULT NURSE REASSESSMENT NOTE - NS ED NURSE REASSESS COMMENT FT1
Patient resting comfortably in bed. Awaiting tests results. VSS. In no acute distress. Will continue to monitor.

## 2019-01-24 NOTE — ED STATDOCS - PROGRESS NOTE DETAILS
36 y/o M pt with PMHx bipolar disorder, schizophrenia, DM, HLD presents to the ED c/o sudden onset L hand numbness 8:30 today.  Pt was driving home from work when he suddenly noticed L hand numbness and heaviness in his tongue.  Pt went into Dr. kulkrani's office (PMD) and was told to come into the ED for evaluation.  Pt has never had similar symptoms.  Currently in the ED he notes his L hand numbness is improved, his is able to move his hand, but still feels mild numbness.  Pt will be sent to the Main ED for further treatment and evaluation. Initial orders placed. Code Stroke Initiated.

## 2019-01-24 NOTE — ED ADULT NURSE REASSESSMENT NOTE - NS ED NURSE REASSESS COMMENT FT1
Patient in no acute distress. VSS. Echo performed. Awaits MRI and US carotid tests. No c/o pain, discomfort or numbness noted. Per PA ok to have dinner, diet office called and will bring tray for the patient. Resting comfortably. Will continue to monitor.

## 2019-01-24 NOTE — ED ADULT TRIAGE NOTE - CHIEF COMPLAINT QUOTE
Patient was driving to [employer] this morning left hand went numb, and states he is mouth and tongue "feel heavy".  Symptoms started at 830am, went to his Dr. Romero office and was advised to go right to ED for CT scan.

## 2019-01-24 NOTE — ED ADULT NURSE REASSESSMENT NOTE - NS ED NURSE REASSESS COMMENT FT1
pt A&Ox4;  resting in stretcher. denies any c/o headache, SOB, or weakness. MD. Alex Tai and NILS Sullivan at bedside to  explain plan of care. pt with no questions or concerns at this time. monitoring on-going for any changes.

## 2019-01-24 NOTE — ED PROVIDER NOTE - OBJECTIVE STATEMENT
43yo m h/o schizophrenia and diabetes p/w left arm numbness. pt was driving at 830 am had numbness and weakness in left arm and numbness in left side of tongue lasting 1 hour and now completely resolved. denies chest pain, shortness of breath or other complaints.

## 2019-01-24 NOTE — ED CDU PROVIDER INITIAL DAY NOTE - MEDICAL DECISION MAKING DETAILS
43yo male complaining of left arm numbness. CT head negative for hemorrhage, pts symptoms have resolved. Pt to get MRI head, ECHO, and carotid doppler.

## 2019-01-24 NOTE — ED ADULT NURSE NOTE - CHIEF COMPLAINT QUOTE
Patient was driving to [employer] this morning left hand went numb, and states he is mouth and tongue "feel heavy".  Symptoms started at 830am, went to his Dr. Romero office and was advised to go right to ED for CT scan.

## 2019-01-24 NOTE — ED ADULT NURSE NOTE - NSIMPLEMENTINTERV_GEN_ALL_ED
Implemented All Universal Safety Interventions:  [place]anna to call system. Call bell, personal items and telephone within reach. Instruct patient to call for assistance. Room bathroom lighting operational. Non-slip footwear when patient is off stretcher. Physically safe environment: no spills, clutter or unnecessary equipment. Stretcher in lowest position, wheels locked, appropriate side rails in place.

## 2019-01-24 NOTE — ED PROVIDER NOTE - MEDICAL DECISION MAKING DETAILS
43yo M with 1 hours numbness/weakness to left hand, now resolved, NIH stroke score 0, ct head neg, will obs for MRI/tia work up

## 2019-01-24 NOTE — ED CDU PROVIDER INITIAL DAY NOTE - OBJECTIVE STATEMENT
45yo m h/o schizophrenia and diabetes p/w left arm numbness. pt was driving at 830 am had numbness and weakness in left arm and numbness in left side of tongue lasting 1 hour and now completely resolved. denies chest pain, shortness of breath or other complaints.  Pt reports this has never happened before. Pt denies CP/SOB, fever/chills, blurry vision, nausea/vomiting.

## 2019-01-24 NOTE — ED PROVIDER NOTE - ATTENDING CONTRIBUTION TO CARE
44 year old man hx of DM c/o sudden onset of left arm numbness while driving at 8:30 this morning.  Patient seen by intake doctor and code stroke called.  Currently on assessment of patient is critical care he is asymptomatic.  NIH score of zero.  CT negative for acute pathology.  Patient serum glucose improved with IV fluids, BP stable.  Patient admitted to observation to be worked up for TIA.

## 2019-01-24 NOTE — ED PROVIDER NOTE - NS ED ATTENDING STATEMENT MOD
cure appendicitis The patient was treated with antibiotics and underwent laparoscopic appendectomy with removal of the appendix. I have personally seen and examined this patient.  I have fully participated in the care of this patient. I have reviewed all pertinent clinical information, including history, physical exam, plan and the Resident’s note and agree except as noted.

## 2019-01-24 NOTE — ED ADULT NURSE REASSESSMENT NOTE - NS ED NURSE REASSESS COMMENT FT1
received pt from previous Rn Deborah Cerrato. pt resting in stretcher,  no signs of neuro deficits noted. NILS Adames at bedside to explain Plan of care. remains afebrile VSS. bed in lowest position,call bell within reach and safety maintained. awaiting MRI results.

## 2019-01-25 ENCOUNTER — EMERGENCY (EMERGENCY)
Facility: HOSPITAL | Age: 44
LOS: 1 days | Discharge: HOME | End: 2019-01-25
Attending: EMERGENCY MEDICINE
Payer: MEDICARE

## 2019-01-25 VITALS
SYSTOLIC BLOOD PRESSURE: 153 MMHG | DIASTOLIC BLOOD PRESSURE: 84 MMHG | HEIGHT: 74 IN | WEIGHT: 220.02 LBS | TEMPERATURE: 98 F | HEART RATE: 96 BPM | OXYGEN SATURATION: 99 % | RESPIRATION RATE: 18 BRPM

## 2019-01-25 LAB
ALBUMIN SERPL ELPH-MCNC: 4.5 G/DL — SIGNIFICANT CHANGE UP (ref 3.3–5.2)
ALP SERPL-CCNC: 96 U/L — SIGNIFICANT CHANGE UP (ref 40–120)
ALT FLD-CCNC: 49 U/L — HIGH
ANION GAP SERPL CALC-SCNC: 12 MMOL/L — SIGNIFICANT CHANGE UP (ref 5–17)
AST SERPL-CCNC: 21 U/L — SIGNIFICANT CHANGE UP
BASOPHILS # BLD AUTO: 0 K/UL — SIGNIFICANT CHANGE UP (ref 0–0.2)
BASOPHILS NFR BLD AUTO: 0.3 % — SIGNIFICANT CHANGE UP (ref 0–2)
BILIRUB SERPL-MCNC: 0.3 MG/DL — LOW (ref 0.4–2)
BUN SERPL-MCNC: 13 MG/DL — SIGNIFICANT CHANGE UP (ref 8–20)
CALCIUM SERPL-MCNC: 9.7 MG/DL — SIGNIFICANT CHANGE UP (ref 8.6–10.2)
CHLORIDE SERPL-SCNC: 97 MMOL/L — LOW (ref 98–107)
CO2 SERPL-SCNC: 25 MMOL/L — SIGNIFICANT CHANGE UP (ref 22–29)
CREAT SERPL-MCNC: 0.81 MG/DL — SIGNIFICANT CHANGE UP (ref 0.5–1.3)
EOSINOPHIL # BLD AUTO: 0.1 K/UL — SIGNIFICANT CHANGE UP (ref 0–0.5)
EOSINOPHIL NFR BLD AUTO: 2.3 % — SIGNIFICANT CHANGE UP (ref 0–5)
GLUCOSE SERPL-MCNC: 330 MG/DL — HIGH (ref 70–115)
HBA1C BLD-MCNC: 9.8 % — HIGH (ref 4–5.6)
HCT VFR BLD CALC: 44 % — SIGNIFICANT CHANGE UP (ref 42–52)
HGB BLD-MCNC: 15.1 G/DL — SIGNIFICANT CHANGE UP (ref 14–18)
LYMPHOCYTES # BLD AUTO: 2.4 K/UL — SIGNIFICANT CHANGE UP (ref 1–4.8)
LYMPHOCYTES # BLD AUTO: 38.9 % — SIGNIFICANT CHANGE UP (ref 20–55)
MCHC RBC-ENTMCNC: 30 PG — SIGNIFICANT CHANGE UP (ref 27–31)
MCHC RBC-ENTMCNC: 34.3 G/DL — SIGNIFICANT CHANGE UP (ref 32–36)
MCV RBC AUTO: 87.3 FL — SIGNIFICANT CHANGE UP (ref 80–94)
MONOCYTES # BLD AUTO: 0.5 K/UL — SIGNIFICANT CHANGE UP (ref 0–0.8)
MONOCYTES NFR BLD AUTO: 8.1 % — SIGNIFICANT CHANGE UP (ref 3–10)
NEUTROPHILS # BLD AUTO: 3 K/UL — SIGNIFICANT CHANGE UP (ref 1.8–8)
NEUTROPHILS NFR BLD AUTO: 50.2 % — SIGNIFICANT CHANGE UP (ref 37–73)
PLATELET # BLD AUTO: 300 K/UL — SIGNIFICANT CHANGE UP (ref 150–400)
POTASSIUM SERPL-MCNC: 4.2 MMOL/L — SIGNIFICANT CHANGE UP (ref 3.5–5.3)
POTASSIUM SERPL-SCNC: 4.2 MMOL/L — SIGNIFICANT CHANGE UP (ref 3.5–5.3)
PROT SERPL-MCNC: 7.9 G/DL — SIGNIFICANT CHANGE UP (ref 6.6–8.7)
RBC # BLD: 5.04 M/UL — SIGNIFICANT CHANGE UP (ref 4.6–6.2)
RBC # FLD: 13.2 % — SIGNIFICANT CHANGE UP (ref 11–15.6)
SODIUM SERPL-SCNC: 134 MMOL/L — LOW (ref 135–145)
TROPONIN T SERPL-MCNC: <0.01 NG/ML — SIGNIFICANT CHANGE UP (ref 0–0.06)
WBC # BLD: 6.1 K/UL — SIGNIFICANT CHANGE UP (ref 4.8–10.8)
WBC # FLD AUTO: 6.1 K/UL — SIGNIFICANT CHANGE UP (ref 4.8–10.8)

## 2019-01-25 PROCEDURE — 82962 GLUCOSE BLOOD TEST: CPT

## 2019-01-25 PROCEDURE — 93005 ELECTROCARDIOGRAM TRACING: CPT

## 2019-01-25 PROCEDURE — 93010 ELECTROCARDIOGRAM REPORT: CPT

## 2019-01-25 PROCEDURE — 36415 COLL VENOUS BLD VENIPUNCTURE: CPT

## 2019-01-25 PROCEDURE — G0378: CPT

## 2019-01-25 PROCEDURE — 99284 EMERGENCY DEPT VISIT MOD MDM: CPT

## 2019-01-25 PROCEDURE — 83036 HEMOGLOBIN GLYCOSYLATED A1C: CPT

## 2019-01-25 PROCEDURE — 70450 CT HEAD/BRAIN W/O DYE: CPT

## 2019-01-25 PROCEDURE — 99217: CPT

## 2019-01-25 PROCEDURE — 85027 COMPLETE CBC AUTOMATED: CPT

## 2019-01-25 PROCEDURE — 85730 THROMBOPLASTIN TIME PARTIAL: CPT

## 2019-01-25 PROCEDURE — T1013: CPT

## 2019-01-25 PROCEDURE — 82009 KETONE BODYS QUAL: CPT

## 2019-01-25 PROCEDURE — 99285 EMERGENCY DEPT VISIT HI MDM: CPT | Mod: 25

## 2019-01-25 PROCEDURE — 85610 PROTHROMBIN TIME: CPT

## 2019-01-25 PROCEDURE — 84484 ASSAY OF TROPONIN QUANT: CPT

## 2019-01-25 PROCEDURE — 99283 EMERGENCY DEPT VISIT LOW MDM: CPT

## 2019-01-25 PROCEDURE — 93880 EXTRACRANIAL BILAT STUDY: CPT

## 2019-01-25 PROCEDURE — 80053 COMPREHEN METABOLIC PANEL: CPT

## 2019-01-25 PROCEDURE — 93306 TTE W/DOPPLER COMPLETE: CPT

## 2019-01-25 PROCEDURE — 82803 BLOOD GASES ANY COMBINATION: CPT

## 2019-01-25 RX ORDER — ACETAMINOPHEN 500 MG
650 TABLET ORAL EVERY 6 HOURS
Qty: 0 | Refills: 0 | Status: DISCONTINUED | OUTPATIENT
Start: 2019-01-25 | End: 2019-01-29

## 2019-01-25 RX ADMIN — Medication 650 MILLIGRAM(S): at 02:16

## 2019-01-25 NOTE — ED ADULT NURSE REASSESSMENT NOTE - NS ED NURSE REASSESS COMMENT FT1
pt sleeping in stretcher, no apparent distress noted. bed in lowest position and safety maintained. pt with complaints to S/L headache. NILS Sullivan made aware and per PA will place orders for Tylenol.  bed in lowest position and safety maintained.

## 2019-01-25 NOTE — ED ADULT NURSE REASSESSMENT NOTE - NS ED NURSE REASSESS COMMENT FT1
pt stated he had MRI already. no results at this time. Spoke with MRI tech; per tech MRI was not obtain and pt will have MRI at 8am this morning. Zacarias Sullivan made aware.

## 2019-01-25 NOTE — ED CDU PROVIDER SUBSEQUENT DAY NOTE - ATTENDING CONTRIBUTION TO CARE
seen with   acp  symptoms have resolved completely awaiting results of mri  PE is unremarkable  Agree with acps assessment and dispo.

## 2019-01-25 NOTE — ED STATDOCS - PROGRESS NOTE DETAILS
PA NOTE: PT evaluated by intake physician. HPI/PE/ROS as noted above. Will follow up plan per intake physician. Neg trop, no objective neuro findings noted, NIH score 0, pt to follow up with neurology for outpt MRI and PCP.

## 2019-01-25 NOTE — ED CDU PROVIDER SUBSEQUENT DAY NOTE - PROGRESS NOTE DETAILS
PT is requesting to leave at this time, stating that the blood work and ct scan is negative and does not feel that the MRI is necessary and does not want to wait. I had a lengthy discussion with patient, and the patient wishes to leave at this time. The patient understands that he/she is leaving against medical advice despite the risk of missing a potential serious diagnosis which may lead to injury, disability and/or death. I discussed with the patient which tests would need to be performed and what type of monitoring would be necessary for the patient as well. I was unable to convince the patient to stay for further work-up. The patient is alert and oriented and demonstrates competence in making medical decisions.

## 2019-01-25 NOTE — ED CDU PROVIDER SUBSEQUENT DAY NOTE - HISTORY
45yo m h/o schizophrenia and diabetes p/w left arm numbness. pt was driving at 830 am had numbness and weakness in left arm and numbness in left side of tongue lasting 1 hour and now completely resolved. denies chest pain, shortness of breath or other complaints.

## 2019-01-25 NOTE — ED ADULT TRIAGE NOTE - CHIEF COMPLAINT QUOTE
I got a feeling of pain on the lt side of my head my lt started to twitch it stopped now.  and the lt hand sleeping in my lt hand it is comiting and going started yesterday it is still happening

## 2019-01-25 NOTE — ED STATDOCS - PHYSICAL EXAMINATION
Gen: No acute distress, non toxic  HEENT: Mucous membranes moist, pink conjunctivae, visual acuity intact left eye, EOMI and PEERL  CV: RRR  Resp: CTAB, normal rate and effort  GI: Abdomen soft, NT, ND. No rebound, no guarding  Neuro: A&O x 3, moving all 4 extremities, normal strength and sensation 5/5 in UE and LE b/l including left hand

## 2019-01-25 NOTE — ED ADULT NURSE REASSESSMENT NOTE - NS ED NURSE REASSESS COMMENT FT1
pt A&OX4.Pt stated he wants to leave and does not want to take MRI. pt educated on importance of not leaving AMA, and having test done.  pt requesting to leave hospital. NILS Sullivan made aware. per PA will come to pt's bedside.

## 2019-01-25 NOTE — ED STATDOCS - NS ED ROS FT
ROS: No fever/chills.  No chest painNo SOB/cough/. No abdominal pain, N/V/D,No dysuria/frequency.   +headache. No Dizziness.    No rashes   +numbness left hand, + left decreased vision.

## 2019-01-25 NOTE — ED CDU PROVIDER DISPOSITION NOTE - ATTENDING CONTRIBUTION TO CARE
seen with acp neuro workup complete no evidence of stroke  no evidence of weakness  will d/c patient   Agree with acps assessment and disposition

## 2019-01-25 NOTE — ED STATDOCS - MEDICAL DECISION MAKING DETAILS
No objective neurological deficits; signed out AMA yesterday. Will check labs and EKG; possible CDU for MRI given only diagnostic missing from workup and f/u with neuro. NIHSS currently zero.

## 2019-01-25 NOTE — ED STATDOCS - ATTENDING CONTRIBUTION TO CARE
Eneida: I performed a face to face bedside interview with patient regarding history of present illness, review of symptoms and past medical history. I completed an independent physical exam and ordered tests/medications as needed.  I have discussed patient's plan of care with advanced care provider. The advanced care provider assisted in  executing the discussed plan. I was available for any questions or issues that may have arose during the execution of the plan of care.

## 2019-01-25 NOTE — ED CDU PROVIDER DISPOSITION NOTE - CLINICAL COURSE
43 y/o male with hx of schizo and dm present to the ed with left arm numbness, symptoms lasted a short while and subsided completely on presentation. Echo showed no abnormalities, as well as carotid u/s and ct scan. Pt was scheduled to have MRI but pt refused and signed out AMA after lengthy discussion.

## 2019-02-01 ENCOUNTER — APPOINTMENT (OUTPATIENT)
Dept: FAMILY MEDICINE | Facility: CLINIC | Age: 44
End: 2019-02-01
Payer: MEDICARE

## 2019-02-01 VITALS
BODY MASS INDEX: 27.46 KG/M2 | TEMPERATURE: 97.6 F | WEIGHT: 214 LBS | OXYGEN SATURATION: 97 % | SYSTOLIC BLOOD PRESSURE: 136 MMHG | HEART RATE: 93 BPM | HEIGHT: 74 IN | DIASTOLIC BLOOD PRESSURE: 92 MMHG

## 2019-02-01 LAB — GLUCOSE BLDC GLUCOMTR-MCNC: 308

## 2019-02-01 PROCEDURE — 99214 OFFICE O/P EST MOD 30 MIN: CPT | Mod: 25

## 2019-02-01 PROCEDURE — 82962 GLUCOSE BLOOD TEST: CPT

## 2019-02-01 RX ORDER — SITAGLIPTIN 100 MG/1
100 TABLET, FILM COATED ORAL DAILY
Qty: 90 | Refills: 1 | Status: DISCONTINUED | COMMUNITY
Start: 2018-09-05 | End: 2019-02-01

## 2019-02-01 NOTE — HISTORY OF PRESENT ILLNESS
[FreeTextEntry1] : ER f/up [de-identified] : Pt w/ DM, Schizophrenia, presents today for f/up after seen in ED at [facility] on 1/24/19.\par Pt was driving to [employer] in the AM whit numbness in left hand went to ED for evaluation.\par Pt had blood tests, EKG, CT head and Carotid Doppler: wnl.\par HbA1c done in ED: 9.8%\par \par Compliant w/ meds, not with diet.\par \par Seen by psychiatry, dr Gutierrez regularly.recently change to Hortensia Lennon MD (Can't recall name). \par reports to feel better.\par \par \par

## 2019-02-01 NOTE — ASSESSMENT
[FreeTextEntry1] : PT presents today post ED evaluation for left hand numbness to R/O TIA-Stroke:\par \par -S/P ED eval on 1/24/19:\par CT head, Carotid Doppler, EKG and Blood tests: wnl\par \par -CPE: 9/2018\par \par -HIV: screening 2016.\par \par -DM:Uncontrolled.\par HbA1c :9.8%. \par On metformin 1000mg bid\par Multiple intolerance to meds: januvia,amaryl and Actos.Refused Invokana, after TV commercial.\par Advise to reassume Invokana untill seen by Endocrinology.\par Endocrinology referral.\par \par -Schizophrenia: continue meds.\par  f/up w/ psychiatry Dr Bazan at [facility].\par \par -Dyslipidemia: pt refused meds in the past.\par D/C atorvastatin 20 mg. Pt prefers brand name Lipitor.\par  Risks and benefits d/w pt.\par \par -Podiatry: seen 4 months ago\par \par -opthalmology: seen < 1 year ago. \par \par

## 2019-02-01 NOTE — HEALTH RISK ASSESSMENT
[No falls in past year] : Patient reported no falls in the past year [0] : 1) Little interest or pleasure doing things: Not at all (0) [Intercurrent ED visits] : went to ED [] : No [de-identified] : FAUZIA

## 2019-02-06 ENCOUNTER — RX RENEWAL (OUTPATIENT)
Age: 44
End: 2019-02-06

## 2019-03-06 ENCOUNTER — APPOINTMENT (OUTPATIENT)
Dept: FAMILY MEDICINE | Facility: CLINIC | Age: 44
End: 2019-03-06
Payer: MEDICARE

## 2019-03-06 VITALS
BODY MASS INDEX: 26.69 KG/M2 | OXYGEN SATURATION: 98 % | HEART RATE: 96 BPM | SYSTOLIC BLOOD PRESSURE: 125 MMHG | TEMPERATURE: 98.6 F | HEIGHT: 74 IN | DIASTOLIC BLOOD PRESSURE: 88 MMHG | WEIGHT: 208 LBS

## 2019-03-06 LAB
GLUCOSE BLDC GLUCOMTR-MCNC: 321
HBA1C MFR BLD HPLC: 8.8

## 2019-03-06 PROCEDURE — 82962 GLUCOSE BLOOD TEST: CPT

## 2019-03-06 PROCEDURE — 83036 HEMOGLOBIN GLYCOSYLATED A1C: CPT | Mod: QW

## 2019-03-06 PROCEDURE — 99213 OFFICE O/P EST LOW 20 MIN: CPT | Mod: 25

## 2019-03-06 RX ORDER — CANAGLIFLOZIN 100 MG/1
100 TABLET, FILM COATED ORAL
Qty: 90 | Refills: 1 | Status: DISCONTINUED | COMMUNITY
Start: 2019-02-01 | End: 2019-03-06

## 2019-03-06 NOTE — HISTORY OF PRESENT ILLNESS
[FreeTextEntry1] : DM f/up [de-identified] : Pt w/ Uncontrolled DM, Schizophrenia, presents today for f/up in DM after recently started on Farxiga on 2/1/19. States is tolerating very well med and feeling better.\par Glucose at home this AM: 140\par \par HbA1c done in ED 1/24/19: 9.8%\par \par Compliant w/ meds, not with diet.\par \par Seen by psychiatry, dr Hortensia GALLARDO. \par reports to feel better.\par \par \par

## 2019-03-06 NOTE — ASSESSMENT
[FreeTextEntry1] : PT presents today for f/up in uncontrolled DM, after strart Farxiga 1 month ago:\par \par -S/P ED eval on 1/24/19:\par CT head, Carotid Doppler, EKG and Blood tests: wnl\par \par -CPE: 9/2018\par \par -HIV: screening 2016.\par \par -DM:Uncontrolled.: glucose 321\par HbA1c :9.8%. 91/24/19); 8.8% (3/6/19)\par On metformin 1000mg bid and Farxiga 10mg daily.\par Multiple intolerance to meds: januvia,amaryl and Actos.Refused Invokana, after TV commercial.\par Endocrinology referral.: on hold.\par Dietitian referral\par Ophtalmology referral.\par \par -Schizophrenia: continue meds.\par  f/up w/ psychiatry Dr Bazan at [facility].\par \par -Dyslipidemia: pt refused meds in the past.\par D/C atorvastatin 20 mg. Pt prefers brand name Lipitor.\par  Risks and benefits d/w pt.\par \par -Podiatry: seen 4 months ago\par \par -opthalmology: seen < 1 year ago. \par Referral.\par \par f/up in 2 months.\par

## 2019-03-06 NOTE — HEALTH RISK ASSESSMENT
[Intercurrent ED visits] : went to ED [No falls in past year] : Patient reported no falls in the past year [0] : 1) Little interest or pleasure doing things: Not at all (0) [] : No [de-identified] : FAUZIA

## 2019-04-05 ENCOUNTER — APPOINTMENT (OUTPATIENT)
Dept: FAMILY MEDICINE | Facility: CLINIC | Age: 44
End: 2019-04-05

## 2019-05-06 ENCOUNTER — APPOINTMENT (OUTPATIENT)
Dept: FAMILY MEDICINE | Facility: CLINIC | Age: 44
End: 2019-05-06
Payer: MEDICARE

## 2019-05-06 VITALS
WEIGHT: 206 LBS | BODY MASS INDEX: 26.44 KG/M2 | DIASTOLIC BLOOD PRESSURE: 89 MMHG | OXYGEN SATURATION: 100 % | TEMPERATURE: 98 F | HEIGHT: 74 IN | HEART RATE: 106 BPM | SYSTOLIC BLOOD PRESSURE: 126 MMHG

## 2019-05-06 LAB — HBA1C MFR BLD HPLC: 8.2

## 2019-05-06 PROCEDURE — 83036 HEMOGLOBIN GLYCOSYLATED A1C: CPT | Mod: QW

## 2019-05-06 PROCEDURE — 99213 OFFICE O/P EST LOW 20 MIN: CPT | Mod: 25

## 2019-05-06 NOTE — HEALTH RISK ASSESSMENT
[Intercurrent ED visits] : went to ED [No falls in past year] : Patient reported no falls in the past year [0] : 1) Little interest or pleasure doing things: Not at all (0) [de-identified] : FAUZIA [] : No

## 2019-05-06 NOTE — HISTORY OF PRESENT ILLNESS
[FreeTextEntry1] : DM f/up [de-identified] : Pt w/ Uncontrolled DM, Schizophrenia, presents today for f/up in DM after recently started on Farxiga on 2/1/19. States is tolerating very well med and feeling better.\par Glucose at home this AM: 140\par \par HbA1c done in ED 1/24/19: 9.8%\par                               3/6/19: 8.8%\par \par Compliant w/ meds, not with diet.\par \par Seen by psychiatry, dr Hortensia GALLARDO. \par reports to feel better.\par \par \par

## 2019-05-06 NOTE — PHYSICAL EXAM
[No Acute Distress] : no acute distress [Well-Appearing] : well-appearing [Well Developed] : well developed [Well Nourished] : well nourished [PERRL] : pupils equal round and reactive to light [Normal Sclera/Conjunctiva] : normal sclera/conjunctiva [Normal Oropharynx] : the oropharynx was normal [Normal Outer Ear/Nose] : the outer ears and nose were normal in appearance [EOMI] : extraocular movements intact [Supple] : supple [No JVD] : no jugular venous distention [Thyroid Normal, No Nodules] : the thyroid was normal and there were no nodules present [No Lymphadenopathy] : no lymphadenopathy [No Respiratory Distress] : no respiratory distress  [No Accessory Muscle Use] : no accessory muscle use [Clear to Auscultation] : lungs were clear to auscultation bilaterally [Normal S1, S2] : normal S1 and S2 [Regular Rhythm] : with a regular rhythm [Normal Rate] : normal rate  [No Murmur] : no murmur heard [No Carotid Bruits] : no carotid bruits [Pedal Pulses Present] : the pedal pulses are present [No Varicosities] : no varicosities [No Abdominal Bruit] : a ~M bruit was not heard ~T in the abdomen [No Extremity Clubbing/Cyanosis] : no extremity clubbing/cyanosis [No Edema] : there was no peripheral edema [No Palpable Aorta] : no palpable aorta [Non Tender] : non-tender [Soft] : abdomen soft [No Masses] : no abdominal mass palpated [No HSM] : no HSM [Non-distended] : non-distended [Normal Bowel Sounds] : normal bowel sounds [Normal Posterior Cervical Nodes] : no posterior cervical lymphadenopathy [Normal Anterior Cervical Nodes] : no anterior cervical lymphadenopathy [No CVA Tenderness] : no CVA  tenderness [No Spinal Tenderness] : no spinal tenderness [Grossly Normal Strength/Tone] : grossly normal strength/tone [No Joint Swelling] : no joint swelling [No Rash] : no rash [Coordination Grossly Intact] : coordination grossly intact [Normal Gait] : normal gait [Deep Tendon Reflexes (DTR)] : deep tendon reflexes were 2+ and symmetric [No Focal Deficits] : no focal deficits [Normal Affect] : the affect was normal [Normal Insight/Judgement] : insight and judgment were intact

## 2019-05-06 NOTE — ASSESSMENT
[FreeTextEntry1] : PT presents today for f/up in uncontrolled DM, after strart Farxiga 1 month ago:\par \par -S/P ED eval on 1/24/19:\par CT head, Carotid Doppler, EKG and Blood tests: wnl\par \par -CPE: 9/2018\par \par -HIV: screening 2016.\par \par -DM:better control\par HbA1c :9.8%. 91/24/19); 8.8% (3/6/19), 8.2% (5/6/19)\par On metformin 1000mg bid and Farxiga 10mg daily.\par Intolerance to multiple meds: januvia,amaryl and Actos.Refused Invokana, after TV commercial.\par Endocrinology referral.: on hold.\par Dietitian eval. pending.\par \par -Schizophrenia: continue meds.\par  f/up w/ psychiatry Dr Bazan at [facility].\par \par -Dyslipidemia:Now on Lipitor.\par \par -Podiatry: seen 4 months ago\par \par -opthalmology: seen < 1 year ago. \par \par \par f/up in 2 months.\par

## 2019-05-06 NOTE — CURRENT MEDS
[Lack of understanding] : lack of understanding [Takes medication as prescribed] : takes [None] : Patient does not have any barriers to medication adherence

## 2019-05-22 ENCOUNTER — RX RENEWAL (OUTPATIENT)
Age: 44
End: 2019-05-22

## 2019-06-03 PROBLEM — N52.9 MALE ERECTILE DISORDER: Status: ACTIVE | Noted: 2017-02-08

## 2019-07-12 ENCOUNTER — EMERGENCY (EMERGENCY)
Facility: HOSPITAL | Age: 44
LOS: 1 days | Discharge: OTHER INSTITUTION | End: 2019-07-12
Attending: STUDENT IN AN ORGANIZED HEALTH CARE EDUCATION/TRAINING PROGRAM
Payer: MEDICARE

## 2019-07-12 VITALS
RESPIRATION RATE: 77 BRPM | SYSTOLIC BLOOD PRESSURE: 142 MMHG | OXYGEN SATURATION: 99 % | HEART RATE: 18 BPM | TEMPERATURE: 98 F | HEIGHT: 74 IN | WEIGHT: 199.96 LBS | DIASTOLIC BLOOD PRESSURE: 93 MMHG

## 2019-07-12 PROCEDURE — 99285 EMERGENCY DEPT VISIT HI MDM: CPT

## 2019-07-12 PROCEDURE — 99284 EMERGENCY DEPT VISIT MOD MDM: CPT

## 2019-07-13 DIAGNOSIS — F28 OTHER PSYCHOTIC DISORDER NOT DUE TO A SUBSTANCE OR KNOWN PHYSIOLOGICAL CONDITION: ICD-10-CM

## 2019-07-13 LAB
AMPHET UR-MCNC: NEGATIVE — SIGNIFICANT CHANGE UP
ANION GAP SERPL CALC-SCNC: 13 MMOL/L — SIGNIFICANT CHANGE UP (ref 5–17)
APAP SERPL-MCNC: <7.5 UG/ML — LOW (ref 10–26)
APPEARANCE UR: CLEAR — SIGNIFICANT CHANGE UP
BACTERIA # UR AUTO: ABNORMAL
BARBITURATES UR SCN-MCNC: NEGATIVE — SIGNIFICANT CHANGE UP
BASOPHILS # BLD AUTO: 0.04 K/UL — SIGNIFICANT CHANGE UP (ref 0–0.2)
BASOPHILS NFR BLD AUTO: 0.7 % — SIGNIFICANT CHANGE UP (ref 0–2)
BENZODIAZ UR-MCNC: NEGATIVE — SIGNIFICANT CHANGE UP
BILIRUB UR-MCNC: NEGATIVE — SIGNIFICANT CHANGE UP
BUN SERPL-MCNC: 13 MG/DL — SIGNIFICANT CHANGE UP (ref 8–20)
CALCIUM SERPL-MCNC: 9.3 MG/DL — SIGNIFICANT CHANGE UP (ref 8.6–10.2)
CHLORIDE SERPL-SCNC: 102 MMOL/L — SIGNIFICANT CHANGE UP (ref 98–107)
CO2 SERPL-SCNC: 23 MMOL/L — SIGNIFICANT CHANGE UP (ref 22–29)
COCAINE METAB.OTHER UR-MCNC: NEGATIVE — SIGNIFICANT CHANGE UP
COLOR SPEC: YELLOW — SIGNIFICANT CHANGE UP
CREAT SERPL-MCNC: 0.83 MG/DL — SIGNIFICANT CHANGE UP (ref 0.5–1.3)
DIFF PNL FLD: NEGATIVE — SIGNIFICANT CHANGE UP
EOSINOPHIL # BLD AUTO: 0.13 K/UL — SIGNIFICANT CHANGE UP (ref 0–0.5)
EOSINOPHIL NFR BLD AUTO: 2.2 % — SIGNIFICANT CHANGE UP (ref 0–6)
EPI CELLS # UR: SIGNIFICANT CHANGE UP
ETHANOL SERPL-MCNC: <10 MG/DL — SIGNIFICANT CHANGE UP
GLUCOSE SERPL-MCNC: 152 MG/DL — HIGH (ref 70–115)
GLUCOSE UR QL: 1000 MG/DL
HCT VFR BLD CALC: 44.7 % — SIGNIFICANT CHANGE UP (ref 39–50)
HGB BLD-MCNC: 15 G/DL — SIGNIFICANT CHANGE UP (ref 13–17)
IMM GRANULOCYTES NFR BLD AUTO: 0.2 % — SIGNIFICANT CHANGE UP (ref 0–1.5)
KETONES UR-MCNC: ABNORMAL
LEUKOCYTE ESTERASE UR-ACNC: NEGATIVE — SIGNIFICANT CHANGE UP
LYMPHOCYTES # BLD AUTO: 2.67 K/UL — SIGNIFICANT CHANGE UP (ref 1–3.3)
LYMPHOCYTES # BLD AUTO: 45.4 % — HIGH (ref 13–44)
MCHC RBC-ENTMCNC: 29.5 PG — SIGNIFICANT CHANGE UP (ref 27–34)
MCHC RBC-ENTMCNC: 33.6 GM/DL — SIGNIFICANT CHANGE UP (ref 32–36)
MCV RBC AUTO: 88 FL — SIGNIFICANT CHANGE UP (ref 80–100)
METHADONE UR-MCNC: NEGATIVE — SIGNIFICANT CHANGE UP
MONOCYTES # BLD AUTO: 0.52 K/UL — SIGNIFICANT CHANGE UP (ref 0–0.9)
MONOCYTES NFR BLD AUTO: 8.8 % — SIGNIFICANT CHANGE UP (ref 2–14)
NEUTROPHILS # BLD AUTO: 2.51 K/UL — SIGNIFICANT CHANGE UP (ref 1.8–7.4)
NEUTROPHILS NFR BLD AUTO: 42.7 % — LOW (ref 43–77)
NITRITE UR-MCNC: NEGATIVE — SIGNIFICANT CHANGE UP
OPIATES UR-MCNC: NEGATIVE — SIGNIFICANT CHANGE UP
PCP SPEC-MCNC: SIGNIFICANT CHANGE UP
PCP UR-MCNC: NEGATIVE — SIGNIFICANT CHANGE UP
PH UR: 6.5 — SIGNIFICANT CHANGE UP (ref 5–8)
PLATELET # BLD AUTO: 281 K/UL — SIGNIFICANT CHANGE UP (ref 150–400)
POTASSIUM SERPL-MCNC: 4 MMOL/L — SIGNIFICANT CHANGE UP (ref 3.5–5.3)
POTASSIUM SERPL-SCNC: 4 MMOL/L — SIGNIFICANT CHANGE UP (ref 3.5–5.3)
PROT UR-MCNC: 15 MG/DL
RBC # BLD: 5.08 M/UL — SIGNIFICANT CHANGE UP (ref 4.2–5.8)
RBC # FLD: 13.4 % — SIGNIFICANT CHANGE UP (ref 10.3–14.5)
RBC CASTS # UR COMP ASSIST: SIGNIFICANT CHANGE UP /HPF (ref 0–4)
SALICYLATES SERPL-MCNC: <0.6 MG/DL — LOW (ref 10–20)
SODIUM SERPL-SCNC: 138 MMOL/L — SIGNIFICANT CHANGE UP (ref 135–145)
SP GR SPEC: 1.01 — SIGNIFICANT CHANGE UP (ref 1.01–1.02)
THC UR QL: NEGATIVE — SIGNIFICANT CHANGE UP
UROBILINOGEN FLD QL: NEGATIVE MG/DL — SIGNIFICANT CHANGE UP
WBC # BLD: 5.88 K/UL — SIGNIFICANT CHANGE UP (ref 3.8–10.5)
WBC # FLD AUTO: 5.88 K/UL — SIGNIFICANT CHANGE UP (ref 3.8–10.5)
WBC UR QL: SIGNIFICANT CHANGE UP

## 2019-07-13 PROCEDURE — 93010 ELECTROCARDIOGRAM REPORT: CPT

## 2019-07-13 RX ORDER — OLANZAPINE 15 MG/1
5 TABLET, FILM COATED ORAL AT BEDTIME
Refills: 0 | Status: DISCONTINUED | OUTPATIENT
Start: 2019-07-13 | End: 2019-07-17

## 2019-07-13 RX ORDER — ARIPIPRAZOLE 15 MG/1
10 TABLET ORAL DAILY
Refills: 0 | Status: DISCONTINUED | OUTPATIENT
Start: 2019-07-13 | End: 2019-07-17

## 2019-07-13 RX ORDER — OLANZAPINE 15 MG/1
5 TABLET, FILM COATED ORAL ONCE
Refills: 0 | Status: COMPLETED | OUTPATIENT
Start: 2019-07-13 | End: 2019-07-13

## 2019-07-13 RX ORDER — DAPAGLIFLOZIN 10 MG/1
1 TABLET, FILM COATED ORAL
Qty: 0 | Refills: 0 | DISCHARGE

## 2019-07-13 RX ORDER — ARIPIPRAZOLE 15 MG/1
1 TABLET ORAL
Qty: 0 | Refills: 0 | DISCHARGE

## 2019-07-13 RX ORDER — HALOPERIDOL DECANOATE 100 MG/ML
5 INJECTION INTRAMUSCULAR EVERY 6 HOURS
Refills: 0 | Status: DISCONTINUED | OUTPATIENT
Start: 2019-07-13 | End: 2019-07-17

## 2019-07-13 RX ORDER — METFORMIN HYDROCHLORIDE 850 MG/1
1 TABLET ORAL
Qty: 0 | Refills: 0 | DISCHARGE

## 2019-07-13 RX ORDER — ZOLPIDEM TARTRATE 10 MG/1
1 TABLET ORAL
Qty: 0 | Refills: 0 | DISCHARGE

## 2019-07-13 RX ORDER — METFORMIN HYDROCHLORIDE 850 MG/1
1000 TABLET ORAL
Refills: 0 | Status: DISCONTINUED | OUTPATIENT
Start: 2019-07-13 | End: 2019-07-17

## 2019-07-13 RX ADMIN — ARIPIPRAZOLE 10 MILLIGRAM(S): 15 TABLET ORAL at 10:18

## 2019-07-13 RX ADMIN — OLANZAPINE 5 MILLIGRAM(S): 15 TABLET, FILM COATED ORAL at 04:16

## 2019-07-13 RX ADMIN — METFORMIN HYDROCHLORIDE 1000 MILLIGRAM(S): 850 TABLET ORAL at 19:11

## 2019-07-13 RX ADMIN — OLANZAPINE 5 MILLIGRAM(S): 15 TABLET, FILM COATED ORAL at 21:41

## 2019-07-13 RX ADMIN — METFORMIN HYDROCHLORIDE 1000 MILLIGRAM(S): 850 TABLET ORAL at 08:49

## 2019-07-13 NOTE — ED ADULT NURSE REASSESSMENT NOTE - CONDITION
Pt awake on rounds watching TV. States he slept well, denying pain. Am accucheck 103, up for breakfast medicated with Glucophage 1000mg.. Affect brighter. Makes good eye contact. No complaints at this time./unchanged

## 2019-07-13 NOTE — ED BEHAVIORAL HEALTH ASSESSMENT NOTE - SUMMARY
45 yo man, hx of bipolar disorder, presents acutely psychotic despite reported compliance with medications  requires admission
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
## 2019-07-13 NOTE — ED ADULT NURSE NOTE - THOUGHT CONTENT
[Delusions

## 2019-07-13 NOTE — ED BEHAVIORAL HEALTH ASSESSMENT NOTE - PRIMARY DX
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

## 2019-07-13 NOTE — ED PROVIDER NOTE - PHYSICAL EXAMINATION
psych  Level of Consciousness: alert, follows commands,  Mood: appropriate,  Affect: EXAGGERATED,   Speech: clear, PRESSURED,   Risk of Harm: no verbalization of thoughts of harm,   Psychiatric Memory: short term intact, SHORT TERM DEFICIT, LONG TERM DEFICIT  Behavior: normal,   No abnormal psychomotor movements.  Perception: AUDITORY HALLUCINATIONS, VISUAL HALLUCINATIONS

## 2019-07-13 NOTE — ED BEHAVIORAL HEALTH NOTE - BEHAVIORAL HEALTH NOTE
EVELINA Note- As per psych pt requires inpatient admission. No beds at [facility] as per [place] [id number] of [facility] and no beds at [facility] [phone] as per [address] [place] [address]. EVELINA contacted Leanne, [id number] with [facility] who agreed to review. Clinical sent via fax to Leanne WASSERMAN [phone], awaiting response.
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
## 2019-07-13 NOTE — ED BEHAVIORAL HEALTH ASSESSMENT NOTE - DESCRIPTION
pleasant talkative polite  Vital Signs Last 24 Hrs  T(C): 36.6 (25 Aug 2018 07:45), Max: 37.1 (25 Aug 2018 01:36)  T(F): 97.9 (25 Aug 2018 07:45), Max: 98.7 (25 Aug 2018 01:36)  HR: 67 (25 Aug 2018 07:45) (67 - 83)  BP: 119/80 (25 Aug 2018 07:45) (109/75 - 128/80)  BP(mean): --  RR: 17 (25 Aug 2018 07:45) (17 - 19)  SpO2: 100% (25 Aug 2018 07:45) (99% - 100%) DM type 2 disable Born in [place]
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
## 2019-07-13 NOTE — CHART NOTE - NSCHARTNOTEFT_GEN_A_CORE
EVELINA Note- Met with pt bedside at request for MD to discuss SHANTELL. Pt is requesting SHANTELL. As per pt she was in [place] about 1 [place] possibly more when she left AMA from there. Pt reports she realizes she would benefit from SHANTELL again. Pt unsure of when she left. As per MR pt was d/c'd from  on 4/26 to [facility]. EVELINA called [facility] to verify spoke with Ken, he report they are unable to verify at this time. It is unclear how many SHANTELL days pt has used and if she requires a three night stay. EVELINA contacted [facility] of admissions as well and is awaiting a response. PT eval also pending and as per MD, pt is not yet medically cleared for d/c. EVELINA following for anticipated eventual SHANTELL.

## 2019-07-13 NOTE — ED PROVIDER NOTE - PROGRESS NOTE DETAILS
Franko: received sign out, medically cleared by previous MD; informed by  staff, plan for inpatient psych transfer

## 2019-07-13 NOTE — ED BEHAVIORAL HEALTH ASSESSMENT NOTE - CURRENT MEDICATION
Abilify 10 mg daily  Zyprexa  5 mg qhs Metformin 1000 mg BID, farxiga 10 mg daily, januvia 100 mg daily atorvastatin 20 mg daily,

## 2019-07-13 NOTE — ED BEHAVIORAL HEALTH ASSESSMENT NOTE - SUICIDE PROTECTIVE FACTORS
High spirituality/Identifies reasons for living/Supportive social network or family/Responsibility to family and others

## 2019-07-13 NOTE — ED PROVIDER NOTE - CLINICAL SUMMARY MEDICAL DECISION MAKING FREE TEXT BOX
43 y/o male with hx of bipolar disorder, schizophrenia presents to the ED for psychiatric evaluation. ED psych clearance, no HI/SI, + hallucinations, will have pt moved to  for eval in am

## 2019-07-13 NOTE — ED ADULT NURSE NOTE - OBJECTIVE STATEMENT
Pt A&Ox4 c/o having psychiatric beliefs that he is Bernard Ger at this time. Pt resting comfortably, VSS, no signs of distress at this time

## 2019-07-13 NOTE — ED ADULT NURSE REASSESSMENT NOTE - REASSESS COMMUNICATION
Pt's brother and sister in law here to visit pt. explained  rules re: . Requesting [facility]. Explained they are not in our network and various hospital being explored. Family took  phone # and will call for update

## 2019-07-13 NOTE — ED BEHAVIORAL HEALTH NOTE - BEHAVIORAL HEALTH NOTE
Social work note: [facility] has no beds, [facility] also has no beds. Pt was accepted by [facility] by MD Whitlock - bed offered by [facility]. As per [facility], they can accept pt tomorrow morning. Worker will arrange for AM p/u
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
## 2019-07-14 VITALS
HEART RATE: 82 BPM | OXYGEN SATURATION: 99 % | SYSTOLIC BLOOD PRESSURE: 126 MMHG | TEMPERATURE: 99 F | DIASTOLIC BLOOD PRESSURE: 88 MMHG | RESPIRATION RATE: 18 BRPM

## 2019-07-14 PROCEDURE — 81001 URINALYSIS AUTO W/SCOPE: CPT

## 2019-07-14 PROCEDURE — T1013: CPT

## 2019-07-14 PROCEDURE — 36415 COLL VENOUS BLD VENIPUNCTURE: CPT

## 2019-07-14 PROCEDURE — 80048 BASIC METABOLIC PNL TOTAL CA: CPT

## 2019-07-14 PROCEDURE — 85027 COMPLETE CBC AUTOMATED: CPT

## 2019-07-14 PROCEDURE — 82962 GLUCOSE BLOOD TEST: CPT

## 2019-07-14 PROCEDURE — 99285 EMERGENCY DEPT VISIT HI MDM: CPT

## 2019-07-14 PROCEDURE — 93005 ELECTROCARDIOGRAM TRACING: CPT

## 2019-07-14 PROCEDURE — 80307 DRUG TEST PRSMV CHEM ANLYZR: CPT

## 2019-07-14 RX ADMIN — METFORMIN HYDROCHLORIDE 1000 MILLIGRAM(S): 850 TABLET ORAL at 08:22

## 2019-07-14 RX ADMIN — ARIPIPRAZOLE 10 MILLIGRAM(S): 15 TABLET ORAL at 08:22
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
## 2019-07-14 NOTE — ED BEHAVIORAL HEALTH NOTE - BEHAVIORAL HEALTH NOTE
Social Work Note: This writer confirmed with Tor[facility] at [place] patient has a bed today. Transportation set up through [facility].

## 2019-07-14 NOTE — ED ADULT NURSE REASSESSMENT NOTE - GENERAL PATIENT STATE
resting/sleeping
comfortable appearance
cooperative/comfortable appearance

## 2019-07-14 NOTE — ED ADULT NURSE REASSESSMENT NOTE - COMFORT CARE
po fluids offered/ambulated to bathroom
plan of care explained/meal provided
meal provided/po fluids offered/plan of care explained
plan of care explained

## 2019-07-14 NOTE — ED ADULT NURSE REASSESSMENT NOTE - STATUS
Pt takes Farxiga 10 mg in AM. Pharmacy does not carry it. Pt will be tranferred to inpatient this am. BS stable at this time
Psych eval needed/awaiting consult
awaiting transfer, no change
awaiting transfer, no change

## 2019-07-24 ENCOUNTER — RX RENEWAL (OUTPATIENT)
Age: 44
End: 2019-07-24

## 2019-08-07 ENCOUNTER — APPOINTMENT (OUTPATIENT)
Dept: FAMILY MEDICINE | Facility: CLINIC | Age: 44
End: 2019-08-07

## 2019-08-07 ENCOUNTER — EMERGENCY (EMERGENCY)
Facility: HOSPITAL | Age: 44
LOS: 1 days | Discharge: LEFT BEFORE DISCHARGE | End: 2019-08-07
Attending: STUDENT IN AN ORGANIZED HEALTH CARE EDUCATION/TRAINING PROGRAM
Payer: MEDICARE

## 2019-08-07 VITALS
TEMPERATURE: 99 F | WEIGHT: 199.96 LBS | HEART RATE: 95 BPM | OXYGEN SATURATION: 97 % | DIASTOLIC BLOOD PRESSURE: 94 MMHG | RESPIRATION RATE: 18 BRPM | SYSTOLIC BLOOD PRESSURE: 149 MMHG | HEIGHT: 74 IN

## 2019-08-07 PROCEDURE — 99283 EMERGENCY DEPT VISIT LOW MDM: CPT

## 2019-08-07 PROCEDURE — T1013: CPT

## 2019-08-07 PROCEDURE — 99282 EMERGENCY DEPT VISIT SF MDM: CPT

## 2019-08-07 NOTE — ED ADULT TRIAGE NOTE - CHIEF COMPLAINT QUOTE
Pt here to see Dr. Ontiveros due to being unable to sleep for "33 days". Pt appears well but is noted to be jittery.

## 2019-08-07 NOTE — ED STATDOCS - OBJECTIVE STATEMENT
43y/o M with PMHx of DM, Schizophrenia and Bipolar presents to the ED c/o Insomnia for 30 days. Pt has been taking Ambien without improvement, is compliant with medication. Pt was recently discharged from [facility] 6 days ago. Pt has Follow up with Dr. Taveras several times and is requesting to Follow up with dr again. Pt additionally presents jittery. Denies suicidal thoughts or hearing voices. 45y/o M with PMHx of DM, Schizophrenia and Bipolar presents to the ED c/o Insomnia for 30 days. Pt has been taking Ambien without improvement, is compliant with medication. Pt was recently discharged from [facility] 6 days ago. Pt has Follow up with Dr. Reeves several times and is requesting to see him again. Pt additionally presents jittery. Denies suicidal thoughts, homicidal thoughts, or hearing voices.

## 2019-08-07 NOTE — ED STATDOCS - ATTENDING CONTRIBUTION TO CARE
I performed a face to face history and physical exam of the patient and discussed their management with the resident/ACP. I reviewed the resident/ACP's note and agree with the documented findings and plan of care.    Pt eloped prior to psychiatric eval.  Pt was not acutely psychotic, suicidal or homicidal.  Pt was free to go.

## 2019-08-07 NOTE — ED STATDOCS - PROGRESS NOTE DETAILS
NILS LINDSEY: Pt eloped from hospital prior to receiving BH evaluation. Pt did not have IV in place.

## 2019-08-07 NOTE — ED STATDOCS - NS ED ROS FT
The pt is awake, alert, and activity watching her iPad.   No fever/chills, No photophobia/eye pain/changes in vision, No ear pain/sore throat/dysphagia, No chest pain/palpitations, no SOB/cough/wheeze/stridor, No abdominal pain, No N/V/D, no dysuria/frequency/discharge, No neck/back pain, no rash, no changes in neurological status/function. +insomnia

## 2019-08-14 NOTE — DISCUSSION/SUMMARY
[Specialty: _____] : Specialty: [unfilled] [PCP: _____] : PCP: [unfilled] [FreeTextEntry1] : Patient could not be reached via telephone, letter sent to address on file. Medication reconciliation pending. Hospital follow up appointment pending.\par  [FreeTextEntry3] : Apt pending.

## 2019-09-12 ENCOUNTER — APPOINTMENT (OUTPATIENT)
Dept: FAMILY MEDICINE | Facility: CLINIC | Age: 44
End: 2019-09-12
Payer: MEDICARE

## 2019-09-12 ENCOUNTER — RESULT CHARGE (OUTPATIENT)
Age: 44
End: 2019-09-12

## 2019-09-12 VITALS
WEIGHT: 211 LBS | DIASTOLIC BLOOD PRESSURE: 80 MMHG | BODY MASS INDEX: 27.08 KG/M2 | TEMPERATURE: 98.6 F | HEIGHT: 74 IN | SYSTOLIC BLOOD PRESSURE: 124 MMHG | HEART RATE: 89 BPM

## 2019-09-12 LAB — HBA1C MFR BLD HPLC: 9.3

## 2019-09-12 PROCEDURE — 99214 OFFICE O/P EST MOD 30 MIN: CPT | Mod: 25

## 2019-09-12 PROCEDURE — 83036 HEMOGLOBIN GLYCOSYLATED A1C: CPT | Mod: QW

## 2019-09-12 NOTE — PHYSICAL EXAM
[Well Nourished] : well nourished [No Acute Distress] : no acute distress [Well-Appearing] : well-appearing [Well Developed] : well developed [Normal Sclera/Conjunctiva] : normal sclera/conjunctiva [PERRL] : pupils equal round and reactive to light [EOMI] : extraocular movements intact [Normal Outer Ear/Nose] : the outer ears and nose were normal in appearance [Normal Oropharynx] : the oropharynx was normal [No JVD] : no jugular venous distention [Supple] : supple [Thyroid Normal, No Nodules] : the thyroid was normal and there were no nodules present [No Lymphadenopathy] : no lymphadenopathy [Clear to Auscultation] : lungs were clear to auscultation bilaterally [No Respiratory Distress] : no respiratory distress  [Normal Rate] : normal rate  [No Accessory Muscle Use] : no accessory muscle use [Normal S1, S2] : normal S1 and S2 [Regular Rhythm] : with a regular rhythm [No Murmur] : no murmur heard [No Carotid Bruits] : no carotid bruits [No Varicosities] : no varicosities [No Abdominal Bruit] : a ~M bruit was not heard ~T in the abdomen [No Edema] : there was no peripheral edema [Pedal Pulses Present] : the pedal pulses are present [No Palpable Aorta] : no palpable aorta [No Extremity Clubbing/Cyanosis] : no extremity clubbing/cyanosis [Non Tender] : non-tender [Soft] : abdomen soft [Non-distended] : non-distended [No Masses] : no abdominal mass palpated [Normal Bowel Sounds] : normal bowel sounds [No HSM] : no HSM [Normal Anterior Cervical Nodes] : no anterior cervical lymphadenopathy [Normal Posterior Cervical Nodes] : no posterior cervical lymphadenopathy [No CVA Tenderness] : no CVA  tenderness [No Spinal Tenderness] : no spinal tenderness [No Joint Swelling] : no joint swelling [Grossly Normal Strength/Tone] : grossly normal strength/tone [No Rash] : no rash [Coordination Grossly Intact] : coordination grossly intact [Normal Gait] : normal gait [No Focal Deficits] : no focal deficits [Deep Tendon Reflexes (DTR)] : deep tendon reflexes were 2+ and symmetric [Normal Insight/Judgement] : insight and judgment were intact [Normal Affect] : the affect was normal

## 2019-09-12 NOTE — COUNSELING
[Healthy eating counseling provided] : healthy eating [Weight management counseling provided] : Weight management [Activity counseling provided] : activity [Fall prevention counseling provided] : Fall prevention counseling provided [Behavioral health counseling provided] : Behavioral health counseling provided [None] : None [Good understanding] : Patient has a good understanding of lifestyle changes and steps needed to achieve self management goal

## 2019-09-12 NOTE — CURRENT MEDS
[Takes medication as prescribed] : takes [Lack of understanding] : lack of understanding [None] : Patient does not have any barriers to medication adherence

## 2019-10-21 NOTE — ED PROVIDER NOTE - DISPOSITION TYPE
Contacted patient. Patient said that she is having left sided hip pain and saw the orthopaedic specialist on 10/7. Patient said that Dr. Jay told her to have PT for hip bursitis. Patient tried to set up PT but said that they never called her back. Patient is taking 8-12 200 mg ibuprofen for the pain and inflammation. She was wondering if PCP would consider short term steroids for treatment of the bursitis. I told patient that PT is very beneficial for long-term hip recovery and prevention, but she still wanted PCP's opinion. Patient refused an office visit until she heard back from provider.   DISCHARGE

## 2019-11-13 ENCOUNTER — APPOINTMENT (OUTPATIENT)
Dept: FAMILY MEDICINE | Facility: CLINIC | Age: 44
End: 2019-11-13
Payer: MEDICARE

## 2019-11-13 VITALS
DIASTOLIC BLOOD PRESSURE: 83 MMHG | HEART RATE: 77 BPM | WEIGHT: 210 LBS | TEMPERATURE: 97.7 F | OXYGEN SATURATION: 99 % | SYSTOLIC BLOOD PRESSURE: 120 MMHG | BODY MASS INDEX: 26.95 KG/M2 | HEIGHT: 74 IN

## 2019-11-13 LAB — HBA1C MFR BLD HPLC: 8.4

## 2019-11-13 PROCEDURE — G0439: CPT

## 2019-11-13 PROCEDURE — 83036 HEMOGLOBIN GLYCOSYLATED A1C: CPT | Mod: QW

## 2019-11-13 PROCEDURE — 36415 COLL VENOUS BLD VENIPUNCTURE: CPT

## 2019-11-13 RX ORDER — LURASIDONE HYDROCHLORIDE 20 MG/1
20 TABLET, FILM COATED ORAL
Refills: 0 | Status: DISCONTINUED | COMMUNITY
Start: 2019-09-12 | End: 2019-11-13

## 2019-11-13 NOTE — HEALTH RISK ASSESSMENT
[Intercurrent ED visits] : went to ED [No falls in past year] : Patient reported no falls in the past year [0] : 1) Little interest or pleasure doing things: Not at all (0) [] : No [de-identified] : FAUZIA

## 2019-11-13 NOTE — HEALTH RISK ASSESSMENT
[Good] : ~his/her~  mood as  good [No falls in past year] : Patient reported no falls in the past year [No] : In the past 12 months have you used drugs other than those required for medical reasons? No [0] : 1) Little interest or pleasure doing things: Not at all (0) [HIV test declined] : HIV test declined [Hepatitis C test declined] : Hepatitis C test declined [With Family] : lives with family [None] : None [On disability] : on disability [] :  [# Of Children ___] : has [unfilled] children [Sexually Active] : sexually active [Feels Safe at Home] : Feels safe at home [Fully functional (bathing, dressing, toileting, transferring, walking, feeding)] : Fully functional (bathing, dressing, toileting, transferring, walking, feeding) [Fully functional (using the telephone, shopping, preparing meals, housekeeping, doing laundry, using] : Fully functional and needs no help or supervision to perform IADLs (using the telephone, shopping, preparing meals, housekeeping, doing laundry, using transportation, managing medications and managing finances) [Smoke Detector] : smoke detector [Seat Belt] :  uses seat belt [Reviewed no changes] : Reviewed no changes [] : No [de-identified] : non sedentary work. 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

## 2019-11-13 NOTE — HISTORY OF PRESENT ILLNESS
[FreeTextEntry1] : DM f/up [de-identified] : Pt w/ Uncontrolled DM, Schizophrenia, presents today for f/up in DM .\par Pt was seen at [facility] ED on 7/21/19 and transfer to [facility] where he stay 3 days due to hallucinations.\par \par Pt states was then seen at [facility]. Meds where adjusted and now feeling better.\par Pt not checking Glucose at home. and states it was doing very well. metformin was reduced to 500mg bid.

## 2019-11-13 NOTE — PHYSICAL EXAM
[No Acute Distress] : no acute distress [Well Nourished] : well nourished [Well Developed] : well developed [Well-Appearing] : well-appearing [Normal Sclera/Conjunctiva] : normal sclera/conjunctiva [PERRL] : pupils equal round and reactive to light [Normal Outer Ear/Nose] : the outer ears and nose were normal in appearance [EOMI] : extraocular movements intact [No JVD] : no jugular venous distention [Normal Oropharynx] : the oropharynx was normal [No Lymphadenopathy] : no lymphadenopathy [Supple] : supple [Thyroid Normal, No Nodules] : the thyroid was normal and there were no nodules present [No Respiratory Distress] : no respiratory distress  [No Accessory Muscle Use] : no accessory muscle use [Clear to Auscultation] : lungs were clear to auscultation bilaterally [Regular Rhythm] : with a regular rhythm [Normal Rate] : normal rate  [Normal S1, S2] : normal S1 and S2 [No Murmur] : no murmur heard [No Carotid Bruits] : no carotid bruits [No Abdominal Bruit] : a ~M bruit was not heard ~T in the abdomen [No Varicosities] : no varicosities [Pedal Pulses Present] : the pedal pulses are present [No Edema] : there was no peripheral edema [No Extremity Clubbing/Cyanosis] : no extremity clubbing/cyanosis [Soft] : abdomen soft [No Palpable Aorta] : no palpable aorta [Non Tender] : non-tender [No Masses] : no abdominal mass palpated [Non-distended] : non-distended [No HSM] : no HSM [Normal Bowel Sounds] : normal bowel sounds [Normal Anterior Cervical Nodes] : no anterior cervical lymphadenopathy [Normal Posterior Cervical Nodes] : no posterior cervical lymphadenopathy [No CVA Tenderness] : no CVA  tenderness [No Joint Swelling] : no joint swelling [No Spinal Tenderness] : no spinal tenderness [Grossly Normal Strength/Tone] : grossly normal strength/tone [No Rash] : no rash [Normal Gait] : normal gait [Coordination Grossly Intact] : coordination grossly intact [No Focal Deficits] : no focal deficits [Deep Tendon Reflexes (DTR)] : deep tendon reflexes were 2+ and symmetric [Normal Affect] : the affect was normal [Normal Insight/Judgement] : insight and judgment were intact

## 2019-11-13 NOTE — HISTORY OF PRESENT ILLNESS
[FreeTextEntry1] : Annual physical [de-identified] : Pt w/ DM, Schizophrenia, presents today for annual physical.\par \par Pt states that d/c invokana aprox 3 weks , ago, due to commercial advertisement and States  contacts him by phone.Did not tolerate amaryl.\par \par Seen by psychiatry, dr Magallon regularly. \par  \par

## 2019-11-13 NOTE — ASSESSMENT
[FreeTextEntry1] : -CPE: blood and UA done today.\par \par -HIV: screening 2016.\par \par -DM: Not well control. Continue meds.HbA1c :8.4%. \par Continue metformin and farxiga.\par  Intolerant to amaryl and refused Invokana.\par \par -Schizophrenia: continue meds and f/up w/ psychiatry Dr Magallon at [facility].\par \par -Dyslipidemia: Lipid profile today. pt refused meds in the past.\par \par -Podiatry: seen 4 months ago\par \par -opthalmology: seen < 1 year ago. [facility]en at [facility]\par \par

## 2019-11-13 NOTE — ASSESSMENT
[FreeTextEntry1] : PT presents today for f/up in uncontrolled DM, after seen in ED at [facility], admitted to [facility] and then aseen at [facility] :\par \par Schizophrenia:\par -Now on latuda and Ziprexa.\par -Continue Psychiatry f/up\par \par -CPE: 9/2018\par \par -HIV: screening 2016.\par \par -DM:Not well control.\par HbA1c :9.3%. \par Reassume metformin 1000mg bid \par Continue Farxiga 10mg daily.\par Intolerance to multiple meds: januvia,amaryl and Actos.Refused Invokana, after TV commercial.\par Endocrinology referral.: on hold.\par Dietitian vera. pending.\par \par -Schizophrenia: continue meds.\par  f/up w/ psychiatry Dr Bazan at [facility].\par \par -Dyslipidemia:Now on Lipitor.\par \par -Podiatry: seen 4 months ago\par \par -opthalmology: seen < 1 year ago. \par \par \par f/up in 2 months.\par

## 2019-11-13 NOTE — COUNSELING
[Weight management counseling provided] : Weight management [Healthy eating counseling provided] : healthy eating [Activity counseling provided] : activity [None] : None [Good understanding] : Patient has a good understanding of lifestyle changes and the steps needed to achieve self management goals [Behavioral health counseling provided] : Behavioral health counseling provided [Fall prevention counseling provided] : Fall prevention counseling provided

## 2019-11-28 LAB
25(OH)D3 SERPL-MCNC: 29.6 NG/ML
ALBUMIN SERPL ELPH-MCNC: 4.5 G/DL
ALP BLD-CCNC: 68 U/L
ALT SERPL-CCNC: 16 U/L
ANION GAP SERPL CALC-SCNC: 14 MMOL/L
APPEARANCE: CLEAR
AST SERPL-CCNC: 14 U/L
BASOPHILS # BLD AUTO: 0.05 K/UL
BASOPHILS NFR BLD AUTO: 0.9 %
BILIRUB SERPL-MCNC: 0.2 MG/DL
BILIRUBIN URINE: NEGATIVE
BLOOD URINE: NEGATIVE
BUN SERPL-MCNC: 15 MG/DL
CALCIUM SERPL-MCNC: 9.7 MG/DL
CHLORIDE SERPL-SCNC: 101 MMOL/L
CHOLEST SERPL-MCNC: 216 MG/DL
CHOLEST/HDLC SERPL: 4.2 RATIO
CO2 SERPL-SCNC: 26 MMOL/L
COLOR: NORMAL
CREAT SERPL-MCNC: 0.93 MG/DL
CREAT SPEC-SCNC: 65 MG/DL
EOSINOPHIL # BLD AUTO: 0.15 K/UL
EOSINOPHIL NFR BLD AUTO: 2.8 %
GLUCOSE QUALITATIVE U: ABNORMAL
GLUCOSE SERPL-MCNC: 196 MG/DL
HCT VFR BLD CALC: 48 %
HDLC SERPL-MCNC: 51 MG/DL
HGB BLD-MCNC: 15.7 G/DL
IMM GRANULOCYTES NFR BLD AUTO: 0.2 %
KETONES URINE: NEGATIVE
LDLC SERPL CALC-MCNC: 112 MG/DL
LEUKOCYTE ESTERASE URINE: NEGATIVE
LYMPHOCYTES # BLD AUTO: 2.22 K/UL
LYMPHOCYTES NFR BLD AUTO: 42.1 %
MAN DIFF?: NORMAL
MCHC RBC-ENTMCNC: 30 PG
MCHC RBC-ENTMCNC: 32.7 GM/DL
MCV RBC AUTO: 91.6 FL
MICROALBUMIN 24H UR DL<=1MG/L-MCNC: <1.2 MG/DL
MICROALBUMIN/CREAT 24H UR-RTO: NORMAL MG/G
MONOCYTES # BLD AUTO: 0.46 K/UL
MONOCYTES NFR BLD AUTO: 8.7 %
NEUTROPHILS # BLD AUTO: 2.38 K/UL
NEUTROPHILS NFR BLD AUTO: 45.3 %
NITRITE URINE: NEGATIVE
PH URINE: 5.5
PLATELET # BLD AUTO: 274 K/UL
POTASSIUM SERPL-SCNC: 4.3 MMOL/L
PROT SERPL-MCNC: 7.1 G/DL
PROTEIN URINE: NEGATIVE
RBC # BLD: 5.24 M/UL
RBC # FLD: 14 %
SODIUM SERPL-SCNC: 141 MMOL/L
SPECIFIC GRAVITY URINE: 1.03
TRIGL SERPL-MCNC: 265 MG/DL
TSH SERPL-ACNC: 5.04 UIU/ML
UROBILINOGEN URINE: NORMAL
WBC # FLD AUTO: 5.27 K/UL

## 2019-12-10 ENCOUNTER — MEDICATION RENEWAL (OUTPATIENT)
Age: 44
End: 2019-12-10

## 2019-12-20 ENCOUNTER — RX RENEWAL (OUTPATIENT)
Age: 44
End: 2019-12-20

## 2020-02-24 ENCOUNTER — APPOINTMENT (OUTPATIENT)
Dept: FAMILY MEDICINE | Facility: CLINIC | Age: 45
End: 2020-02-24
Payer: MEDICARE

## 2020-02-24 VITALS
OXYGEN SATURATION: 97 % | BODY MASS INDEX: 26.95 KG/M2 | HEART RATE: 92 BPM | WEIGHT: 210 LBS | DIASTOLIC BLOOD PRESSURE: 79 MMHG | HEIGHT: 74 IN | RESPIRATION RATE: 16 BRPM | SYSTOLIC BLOOD PRESSURE: 126 MMHG | TEMPERATURE: 98.2 F

## 2020-02-24 DIAGNOSIS — R29.898 OTHER SYMPTOMS AND SIGNS INVOLVING THE MUSCULOSKELETAL SYSTEM: ICD-10-CM

## 2020-02-24 DIAGNOSIS — R20.0 ANESTHESIA OF SKIN: ICD-10-CM

## 2020-02-24 DIAGNOSIS — Z86.39 PERSONAL HISTORY OF OTHER ENDOCRINE, NUTRITIONAL AND METABOLIC DISEASE: ICD-10-CM

## 2020-02-24 DIAGNOSIS — Z87.09 PERSONAL HISTORY OF OTHER DISEASES OF THE RESPIRATORY SYSTEM: ICD-10-CM

## 2020-02-24 DIAGNOSIS — J06.9 ACUTE UPPER RESPIRATORY INFECTION, UNSPECIFIED: ICD-10-CM

## 2020-02-24 DIAGNOSIS — M79.10 MYALGIA, UNSPECIFIED SITE: ICD-10-CM

## 2020-02-24 DIAGNOSIS — R21 RASH AND OTHER NONSPECIFIC SKIN ERUPTION: ICD-10-CM

## 2020-02-24 DIAGNOSIS — R07.89 OTHER CHEST PAIN: ICD-10-CM

## 2020-02-24 LAB — HBA1C MFR BLD HPLC: 8.1

## 2020-02-24 PROCEDURE — 99213 OFFICE O/P EST LOW 20 MIN: CPT | Mod: 25

## 2020-02-24 PROCEDURE — 36415 COLL VENOUS BLD VENIPUNCTURE: CPT

## 2020-02-24 PROCEDURE — 83036 HEMOGLOBIN GLYCOSYLATED A1C: CPT | Mod: QW

## 2020-02-24 RX ORDER — BETAMETHASONE DIPROPIONATE 0.5 MG/G
0.05 LOTION TOPICAL DAILY
Qty: 60 | Refills: 3 | Status: COMPLETED | COMMUNITY
Start: 2017-06-23 | End: 2020-02-24

## 2020-02-24 RX ORDER — NAPROXEN 500 MG/1
500 TABLET ORAL
Qty: 60 | Refills: 0 | Status: COMPLETED | COMMUNITY
Start: 2017-04-12 | End: 2020-02-24

## 2020-02-24 NOTE — ASSESSMENT
[FreeTextEntry1] : -Bilateral thigh pain after exertion at work, pain most likely secondary to physical deconditioning. Will check CPK, CMP and recommend hydration and rest.\par \par -Pt's HgbA1c is 8.1 down from 8.4, recommend to adhere to a diabetic diet and regular exercise aside from continuing with medication as per Dr Humphrey's recommendations.\par \par -Declined Flu vaccine\par \par -Pneumovax deferred by patient to a later date.

## 2020-02-24 NOTE — PHYSICAL EXAM
[No Joint Swelling] : no joint swelling [Grossly Normal Strength/Tone] : grossly normal strength/tone [Normal] : no rash

## 2020-02-24 NOTE — HEALTH RISK ASSESSMENT
[No] : In the past 12 months have you used drugs other than those required for medical reasons? No [0] : 2) Feeling down, depressed, or hopeless: Not at all (0) [de-identified] : none [Audit-CScore] : 0 [] : No [FreeTextEntry1] : being followed by psychiatry [[id number]] : 0 [de-identified] : portion control, avoiding carbs

## 2020-02-24 NOTE — HISTORY OF PRESENT ILLNESS
[FreeTextEntry8] : Pt presents c/o acute bilateral thigh pain x 4 days, noticed at work while squatting and trying to get up, denies any acute trauma, denies heavy exercising, states that he drinks plenty of water. No other associated symptoms. Pt also here for Diabetes check.

## 2020-02-25 LAB
ALBUMIN SERPL ELPH-MCNC: 4.9 G/DL
ALP BLD-CCNC: 74 U/L
ALT SERPL-CCNC: 26 U/L
ANION GAP SERPL CALC-SCNC: 15 MMOL/L
AST SERPL-CCNC: 21 U/L
BILIRUB SERPL-MCNC: 0.2 MG/DL
BUN SERPL-MCNC: 11 MG/DL
CALCIUM SERPL-MCNC: 9.9 MG/DL
CHLORIDE SERPL-SCNC: 102 MMOL/L
CK SERPL-CCNC: 152 U/L
CO2 SERPL-SCNC: 22 MMOL/L
CREAT SERPL-MCNC: 0.73 MG/DL
GLUCOSE SERPL-MCNC: 201 MG/DL
POTASSIUM SERPL-SCNC: 4.4 MMOL/L
PROT SERPL-MCNC: 7.8 G/DL
SODIUM SERPL-SCNC: 139 MMOL/L

## 2020-05-22 ENCOUNTER — APPOINTMENT (OUTPATIENT)
Dept: FAMILY MEDICINE | Facility: CLINIC | Age: 45
End: 2020-05-22
Payer: MEDICARE

## 2020-05-22 ENCOUNTER — RESULT CHARGE (OUTPATIENT)
Age: 45
End: 2020-05-22

## 2020-05-22 VITALS
SYSTOLIC BLOOD PRESSURE: 130 MMHG | BODY MASS INDEX: 27.61 KG/M2 | OXYGEN SATURATION: 97 % | RESPIRATION RATE: 15 BRPM | DIASTOLIC BLOOD PRESSURE: 90 MMHG | WEIGHT: 215.13 LBS | HEIGHT: 74 IN | TEMPERATURE: 98 F | HEART RATE: 111 BPM

## 2020-05-22 LAB — HBA1C MFR BLD HPLC: 10

## 2020-05-22 PROCEDURE — 36415 COLL VENOUS BLD VENIPUNCTURE: CPT

## 2020-05-22 PROCEDURE — 83036 HEMOGLOBIN GLYCOSYLATED A1C: CPT | Mod: QW

## 2020-05-22 PROCEDURE — 99214 OFFICE O/P EST MOD 30 MIN: CPT | Mod: 25

## 2020-05-22 NOTE — HEALTH RISK ASSESSMENT
[Intercurrent ED visits] : went to ED [No] : In the past 12 months have you used drugs other than those required for medical reasons? No [No falls in past year] : Patient reported no falls in the past year [0] : 1) Little interest or pleasure doing things: Not at all (0) [] : No [de-identified] : FAUZIA

## 2020-05-22 NOTE — PHYSICAL EXAM
[No Acute Distress] : no acute distress [Well Nourished] : well nourished [Well Developed] : well developed [Well-Appearing] : well-appearing [Normal Sclera/Conjunctiva] : normal sclera/conjunctiva [PERRL] : pupils equal round and reactive to light [Normal Outer Ear/Nose] : the outer ears and nose were normal in appearance [EOMI] : extraocular movements intact [Normal Oropharynx] : the oropharynx was normal [No JVD] : no jugular venous distention [No Lymphadenopathy] : no lymphadenopathy [Supple] : supple [Thyroid Normal, No Nodules] : the thyroid was normal and there were no nodules present [No Respiratory Distress] : no respiratory distress  [Clear to Auscultation] : lungs were clear to auscultation bilaterally [No Accessory Muscle Use] : no accessory muscle use [Normal Rate] : normal rate  [Regular Rhythm] : with a regular rhythm [Normal S1, S2] : normal S1 and S2 [No Murmur] : no murmur heard [No Carotid Bruits] : no carotid bruits [No Abdominal Bruit] : a ~M bruit was not heard ~T in the abdomen [No Varicosities] : no varicosities [Pedal Pulses Present] : the pedal pulses are present [No Edema] : there was no peripheral edema [No Palpable Aorta] : no palpable aorta [No Extremity Clubbing/Cyanosis] : no extremity clubbing/cyanosis [Soft] : abdomen soft [Non Tender] : non-tender [Non-distended] : non-distended [No Masses] : no abdominal mass palpated [No HSM] : no HSM [Normal Bowel Sounds] : normal bowel sounds [Normal Posterior Cervical Nodes] : no posterior cervical lymphadenopathy [No CVA Tenderness] : no CVA  tenderness [Normal Anterior Cervical Nodes] : no anterior cervical lymphadenopathy [No Spinal Tenderness] : no spinal tenderness [No Joint Swelling] : no joint swelling [Grossly Normal Strength/Tone] : grossly normal strength/tone [No Rash] : no rash [Normal Gait] : normal gait [Coordination Grossly Intact] : coordination grossly intact [No Focal Deficits] : no focal deficits [Deep Tendon Reflexes (DTR)] : deep tendon reflexes were 2+ and symmetric [Normal Affect] : the affect was normal [Normal Insight/Judgement] : insight and judgment were intact

## 2020-05-22 NOTE — HISTORY OF PRESENT ILLNESS
[de-identified] : Pt w/ Uncontrolled DM, Schizophrenia, presents today for f/up in DM .\par Pt was seen at [facility] ED on 7/21/19 and transfer to [place] where he stay 3 days due to hallucinations.\par \par Pt not checking Glucose at home. Last HbA1c: 8.1% [FreeTextEntry1] : DM f/up

## 2020-05-22 NOTE — ASSESSMENT
[FreeTextEntry1] : PT presents today for f/up in uncontrolled DM, after seen in ED at [facility], admitted to [facility] and then seen at [facility] :\par \par Schizophrenia:\par -Now on Abilify and Ziprexa.\par -Continue Psychiatry f/up with Dr Saenz\par \par -CPE: 11/19\par \par -HIV: screening 2016.\par \par -DM:Not well control.\par HbA1c :10%. \par On metformin 1000mg bid \par On  Farxiga 10mg daily.\par Start glipizide 5mg.\par Intolerance to multiple meds: januvia,amaryl and Actos.Refused Invokana, after TV commercial.\par Endocrinology referral.: on hold.\par Dietitian vera. pending.\par \par -Schizophrenia: continue meds.\par  f/up w/ psychiatry Dr Bazan at [facility].\par \par -Dyslipidemia:Now on Lipitor.\par \par -Podiatry: seen 4 months ago\par \par -opthalmology: seen < 1 year ago. \par \par \par f/up in 2 months.\par

## 2020-05-22 NOTE — CURRENT MEDS
[None] : Patient does not have any barriers to medication adherence [Takes medication as prescribed] : takes [Lack of understanding] : lack of understanding

## 2020-05-26 LAB
ALBUMIN SERPL ELPH-MCNC: 5 G/DL
ALP BLD-CCNC: 87 U/L
ALT SERPL-CCNC: 37 U/L
ANION GAP SERPL CALC-SCNC: 18 MMOL/L
AST SERPL-CCNC: 22 U/L
BILIRUB SERPL-MCNC: 0.2 MG/DL
BUN SERPL-MCNC: 11 MG/DL
CALCIUM SERPL-MCNC: 10.7 MG/DL
CHLORIDE SERPL-SCNC: 98 MMOL/L
CHOLEST SERPL-MCNC: 188 MG/DL
CHOLEST/HDLC SERPL: 3.7 RATIO
CO2 SERPL-SCNC: 23 MMOL/L
CREAT SERPL-MCNC: 0.85 MG/DL
GLUCOSE SERPL-MCNC: 339 MG/DL
HDLC SERPL-MCNC: 51 MG/DL
LDLC SERPL CALC-MCNC: 71 MG/DL
POTASSIUM SERPL-SCNC: 4.6 MMOL/L
PROT SERPL-MCNC: 8.2 G/DL
SODIUM SERPL-SCNC: 138 MMOL/L
TRIGL SERPL-MCNC: 328 MG/DL

## 2020-07-01 NOTE — ED PROVIDER NOTE - CHPI ED SYMPTOMS NEG
No
no weakness/no weight loss/no change in level of consciousness/no disorientation/no agitation/no homicidal/no suicidal

## 2020-08-13 ENCOUNTER — RX RENEWAL (OUTPATIENT)
Age: 45
End: 2020-08-13

## 2020-08-26 ENCOUNTER — APPOINTMENT (OUTPATIENT)
Dept: FAMILY MEDICINE | Facility: CLINIC | Age: 45
End: 2020-08-26
Payer: MEDICARE

## 2020-08-26 ENCOUNTER — RESULT CHARGE (OUTPATIENT)
Age: 45
End: 2020-08-26

## 2020-08-26 VITALS
HEART RATE: 100 BPM | BODY MASS INDEX: 27.59 KG/M2 | OXYGEN SATURATION: 99 % | TEMPERATURE: 97.3 F | DIASTOLIC BLOOD PRESSURE: 89 MMHG | SYSTOLIC BLOOD PRESSURE: 131 MMHG | WEIGHT: 215 LBS | HEIGHT: 74 IN

## 2020-08-26 LAB — HBA1C MFR BLD HPLC: 8.8

## 2020-08-26 PROCEDURE — 83036 HEMOGLOBIN GLYCOSYLATED A1C: CPT | Mod: QW

## 2020-08-26 PROCEDURE — 99214 OFFICE O/P EST MOD 30 MIN: CPT | Mod: 25

## 2020-08-26 NOTE — HEALTH RISK ASSESSMENT
[Intercurrent ED visits] : went to ED [No] : In the past 12 months have you used drugs other than those required for medical reasons? No [No falls in past year] : Patient reported no falls in the past year [0] : 1) Little interest or pleasure doing things: Not at all (0) [] : No [de-identified] : FAUZIA

## 2020-08-26 NOTE — HISTORY OF PRESENT ILLNESS
[FreeTextEntry1] : DM f/up [de-identified] : Pt w/ Uncontrolled DM, Schizophrenia, presents today for f/up in DM .\par Pt on metformin and farxiga, started on Glipizide 3 months ago and states has diarrhea since.\par Pt seen by psychiatry on  Abilify and Zyprexa.\par \par Pt not checking Glucose at home. Last HbA1c: 10%

## 2020-08-26 NOTE — ASSESSMENT
[FreeTextEntry1] : PT presents today for f/up in uncontrolled DM,\par \par Schizophrenia:\par -Now on Abilify and Ziprexa.\par -Continue Psychiatry f/up with Dr Saenz\par \par -CPE: 11/19\par -HIV: screening 2016.\par \par -DM:Not well control.\par HbA1c :8.8%\par On metformin 1000mg bid \par On  Farxiga 10mg daily.\par D/C glipizide 5mg due to diarrhea/ Start Glyburide 5 mg.\par Intolerance to multiple meds: januvia,amaryl and Actos.Refused Invokana, after TV commercial.\par Endocrinology referral.: on hold.\par Dietitian vera. pending.\par \par -Schizophrenia: continue meds.\par  f/up w/ psychiatry Dr Bazan at [facility].\par \par -Dyslipidemia:Now on Lipitor.\par \par -Podiatry: seen 4 months ago\par \par -opthalmology: seen < 1 year ago. \par \par \par f/up in 2 months.\par

## 2020-08-26 NOTE — PHYSICAL EXAM
[No Acute Distress] : no acute distress [Well Nourished] : well nourished [Well Developed] : well developed [Well-Appearing] : well-appearing [Normal Sclera/Conjunctiva] : normal sclera/conjunctiva [PERRL] : pupils equal round and reactive to light [EOMI] : extraocular movements intact [Normal Outer Ear/Nose] : the outer ears and nose were normal in appearance [Normal Oropharynx] : the oropharynx was normal [No JVD] : no jugular venous distention [Supple] : supple [No Lymphadenopathy] : no lymphadenopathy [Thyroid Normal, No Nodules] : the thyroid was normal and there were no nodules present [Clear to Auscultation] : lungs were clear to auscultation bilaterally [No Respiratory Distress] : no respiratory distress  [Normal Rate] : normal rate  [No Accessory Muscle Use] : no accessory muscle use [Regular Rhythm] : with a regular rhythm [Normal S1, S2] : normal S1 and S2 [No Murmur] : no murmur heard [No Carotid Bruits] : no carotid bruits [No Abdominal Bruit] : a ~M bruit was not heard ~T in the abdomen [No Varicosities] : no varicosities [Pedal Pulses Present] : the pedal pulses are present [No Edema] : there was no peripheral edema [No Extremity Clubbing/Cyanosis] : no extremity clubbing/cyanosis [No Palpable Aorta] : no palpable aorta [Soft] : abdomen soft [Non Tender] : non-tender [Non-distended] : non-distended [Normal Bowel Sounds] : normal bowel sounds [No Masses] : no abdominal mass palpated [No HSM] : no HSM [No CVA Tenderness] : no CVA  tenderness [Normal Anterior Cervical Nodes] : no anterior cervical lymphadenopathy [Normal Posterior Cervical Nodes] : no posterior cervical lymphadenopathy [Grossly Normal Strength/Tone] : grossly normal strength/tone [No Spinal Tenderness] : no spinal tenderness [No Joint Swelling] : no joint swelling [Normal Gait] : normal gait [No Rash] : no rash [No Focal Deficits] : no focal deficits [Deep Tendon Reflexes (DTR)] : deep tendon reflexes were 2+ and symmetric [Coordination Grossly Intact] : coordination grossly intact [Normal Insight/Judgement] : insight and judgment were intact [Normal Affect] : the affect was normal

## 2020-08-26 NOTE — CURRENT MEDS
[Takes medication as prescribed] : takes [None] : Patient does not have any barriers to medication adherence [Lack of understanding] : lack of understanding

## 2020-10-15 ENCOUNTER — RX RENEWAL (OUTPATIENT)
Age: 45
End: 2020-10-15

## 2020-10-21 ENCOUNTER — APPOINTMENT (OUTPATIENT)
Dept: FAMILY MEDICINE | Facility: CLINIC | Age: 45
End: 2020-10-21
Payer: MEDICARE

## 2020-10-21 ENCOUNTER — RESULT CHARGE (OUTPATIENT)
Age: 45
End: 2020-10-21

## 2020-10-21 VITALS
TEMPERATURE: 96.2 F | HEART RATE: 87 BPM | OXYGEN SATURATION: 9 % | HEIGHT: 74 IN | WEIGHT: 212 LBS | RESPIRATION RATE: 14 BRPM | BODY MASS INDEX: 27.21 KG/M2 | SYSTOLIC BLOOD PRESSURE: 120 MMHG | DIASTOLIC BLOOD PRESSURE: 80 MMHG

## 2020-10-21 DIAGNOSIS — Z23 ENCOUNTER FOR IMMUNIZATION: ICD-10-CM

## 2020-10-21 LAB — HBA1C MFR BLD HPLC: 8.3

## 2020-10-21 PROCEDURE — 83036 HEMOGLOBIN GLYCOSYLATED A1C: CPT | Mod: QW

## 2020-10-21 PROCEDURE — 90686 IIV4 VACC NO PRSV 0.5 ML IM: CPT

## 2020-10-21 PROCEDURE — G0008: CPT

## 2020-10-21 PROCEDURE — 36415 COLL VENOUS BLD VENIPUNCTURE: CPT

## 2020-10-21 PROCEDURE — 99214 OFFICE O/P EST MOD 30 MIN: CPT | Mod: 25

## 2020-10-21 RX ORDER — GLYBURIDE 5 MG/1
5 TABLET ORAL DAILY
Qty: 30 | Refills: 3 | Status: DISCONTINUED | COMMUNITY
Start: 2020-08-26 | End: 2020-10-21

## 2020-10-21 NOTE — HEALTH RISK ASSESSMENT
[Intercurrent ED visits] : went to ED [No] : In the past 12 months have you used drugs other than those required for medical reasons? No [No falls in past year] : Patient reported no falls in the past year [0] : 1) Little interest or pleasure doing things: Not at all (0) [] : No [de-identified] : FAUZIA

## 2020-10-21 NOTE — HISTORY OF PRESENT ILLNESS
[FreeTextEntry1] : DM f/up [de-identified] : Pt w/ Uncontrolled DM, Schizophrenia, presents today for f/up in DM .\par Pt on metformin and farxiga, started on Glipizide 3 months ago and states has diarrhea since.\par Pt seen by psychiatry on  Abilify and Zyprexa.\par \par Pt not checking Glucose at home. Last HbA1c: 8.8%

## 2020-10-21 NOTE — PLAN
[FreeTextEntry1] : Patient given Flu Vaccine in left deltoid by RN, he tolerated well.  Denies adverse reactions to injections, education sheet provided in Mongolian.  GABBY, RN

## 2020-10-21 NOTE — ASSESSMENT
[FreeTextEntry1] : PT presents today for f/up in uncontrolled DM,\par \par Schizophrenia:\par -Now on Abilify and Ziprexa.\par -Continue Psychiatry f/up with Dr Saenz\par \par -CPE: 11/19\par -HIV: screening 2016.\par \par -DM:Better control.\par HbA1c :8.1%\par Decrease metformin to 500mg bid \par On  Farxiga 10mg daily.\par Continue glipizide 5mg .\par Intolerance to multiple meds: januvia,amaryl and Actos.Refused Invokana, after TV commercial.\par Endocrinology referral.: on hold.\par Dietitian eval. pending.\par \par -Schizophrenia: continue meds.\par  f/up w/ psychiatry Dr Bazan at [facility].\par \par -Dyslipidemia:Now on Lipitor.\par \par -Podiatry: seen 4 months ago\par \par -opthalmology: seen < 1 year ago. \par  Flu shot today\par \par f/up in 3 months.\par

## 2020-10-22 LAB
ALBUMIN SERPL ELPH-MCNC: 4.8 G/DL
ALP BLD-CCNC: 83 U/L
ALT SERPL-CCNC: 21 U/L
ANION GAP SERPL CALC-SCNC: 14 MMOL/L
AST SERPL-CCNC: 16 U/L
BASOPHILS # BLD AUTO: 0.02 K/UL
BASOPHILS NFR BLD AUTO: 0.4 %
BILIRUB SERPL-MCNC: 0.3 MG/DL
BUN SERPL-MCNC: 13 MG/DL
CALCIUM SERPL-MCNC: 10.5 MG/DL
CHLORIDE SERPL-SCNC: 100 MMOL/L
CO2 SERPL-SCNC: 26 MMOL/L
CREAT SERPL-MCNC: 0.93 MG/DL
EOSINOPHIL # BLD AUTO: 0.09 K/UL
EOSINOPHIL NFR BLD AUTO: 1.6 %
GLUCOSE SERPL-MCNC: 219 MG/DL
HCT VFR BLD CALC: 52.3 %
HGB BLD-MCNC: 16.7 G/DL
IMM GRANULOCYTES NFR BLD AUTO: 0.2 %
LYMPHOCYTES # BLD AUTO: 1.68 K/UL
LYMPHOCYTES NFR BLD AUTO: 30.3 %
MAN DIFF?: NORMAL
MCHC RBC-ENTMCNC: 29.9 PG
MCHC RBC-ENTMCNC: 31.9 GM/DL
MCV RBC AUTO: 93.6 FL
MONOCYTES # BLD AUTO: 0.34 K/UL
MONOCYTES NFR BLD AUTO: 6.1 %
NEUTROPHILS # BLD AUTO: 3.41 K/UL
NEUTROPHILS NFR BLD AUTO: 61.4 %
PLATELET # BLD AUTO: 286 K/UL
POTASSIUM SERPL-SCNC: 4.3 MMOL/L
PROT SERPL-MCNC: 7.9 G/DL
RBC # BLD: 5.59 M/UL
RBC # FLD: 13.7 %
SODIUM SERPL-SCNC: 140 MMOL/L
WBC # FLD AUTO: 5.55 K/UL

## 2020-11-24 NOTE — ED STATDOCS - PSH
Replaced agressive IV and PO,will monitor closely in ICU with BPM Q 4 hours.     No significant past surgical history

## 2020-12-15 NOTE — ED ADULT NURSE NOTE - TOBACCO SCREENING (FROM THE ASSIST)
Chief Complaint   Patient presents with     Surgical Followup     Lap osman     Procedure with  on 11/30/20    Medication Reconciliation: complete    Kylah Talavera LPN     Statement Selected

## 2021-01-20 ENCOUNTER — RESULT CHARGE (OUTPATIENT)
Age: 46
End: 2021-01-20

## 2021-01-20 ENCOUNTER — APPOINTMENT (OUTPATIENT)
Dept: FAMILY MEDICINE | Facility: CLINIC | Age: 46
End: 2021-01-20
Payer: MEDICARE

## 2021-01-20 VITALS
TEMPERATURE: 97.6 F | RESPIRATION RATE: 14 BRPM | SYSTOLIC BLOOD PRESSURE: 124 MMHG | WEIGHT: 215 LBS | HEART RATE: 100 BPM | OXYGEN SATURATION: 98 % | HEIGHT: 74 IN | BODY MASS INDEX: 27.59 KG/M2 | DIASTOLIC BLOOD PRESSURE: 82 MMHG

## 2021-01-20 PROCEDURE — 83036 HEMOGLOBIN GLYCOSYLATED A1C: CPT | Mod: QW

## 2021-01-20 PROCEDURE — 36415 COLL VENOUS BLD VENIPUNCTURE: CPT

## 2021-01-20 PROCEDURE — 99213 OFFICE O/P EST LOW 20 MIN: CPT | Mod: 25

## 2021-01-20 NOTE — ASSESSMENT
[FreeTextEntry1] : PT presents today for f/up in uncontrolled DM,\par \par Schizophrenia:\par -Now on Abilify and Ziprexa.\par -Continue Psychiatry f/up with Dr Saenz\par \par -CPE: 11/19\par -HIV: screening 2016.\par \par -DM:Not well control\par HbA1c :8.8%\par On metformin to 500mg bid (did not tolerate higher dose)\par On  Farxiga 10mg daily.\par Continue glipizide 5mg .\par Start Onglyza (samples for 2 weeks)\par Intolerance to multiple meds: januvia,amaryl and Actos.Refused Invokana, after TV commercial.\par Endocrinology referral.: on hold.\par Dietitian vera. pending.\par \par -Schizophrenia: continue meds.\par  f/up w/ psychiatry Dr Bazan at [facility].\par \par -Dyslipidemia:Now on Lipitor.\par \par -Podiatry: seen 4 months ago\par \par -opthalmology: seen < 1 year ago. \par \par f/up in 3 months.\par

## 2021-01-20 NOTE — HEALTH RISK ASSESSMENT
[Intercurrent ED visits] : went to ED [No] : In the past 12 months have you used drugs other than those required for medical reasons? No [No falls in past year] : Patient reported no falls in the past year [0] : 1) Little interest or pleasure doing things: Not at all (0) [] : No [de-identified] : FAUZIA

## 2021-01-20 NOTE — HISTORY OF PRESENT ILLNESS
[FreeTextEntry1] : DM f/up [de-identified] : Pt w/ Uncontrolled DM, Schizophrenia, presents today for f/up in DM .\par Pt on metformin, farxiga,and Glipizide .diarrhea improved since reduce metfromin dose.\par Pt seen by psychiatry on  Abilify and Zyprexa.\par \par Pt not checking Glucose at home. Last HbA1c: 8.3%

## 2021-01-22 LAB
ANION GAP SERPL CALC-SCNC: 17 MMOL/L
BUN SERPL-MCNC: 13 MG/DL
CALCIUM SERPL-MCNC: 10.2 MG/DL
CHLORIDE SERPL-SCNC: 99 MMOL/L
CO2 SERPL-SCNC: 22 MMOL/L
CREAT SERPL-MCNC: 0.98 MG/DL
GLUCOSE SERPL-MCNC: 319 MG/DL
HBA1C MFR BLD HPLC: 8.7
POTASSIUM SERPL-SCNC: 4.6 MMOL/L
SODIUM SERPL-SCNC: 139 MMOL/L

## 2021-01-25 RX ORDER — SAXAGLIPTIN 5 MG/1
5 TABLET, FILM COATED ORAL
Qty: 90 | Refills: 1 | Status: DISCONTINUED | COMMUNITY
Start: 2021-01-20 | End: 2021-01-25

## 2021-03-17 ENCOUNTER — APPOINTMENT (OUTPATIENT)
Dept: FAMILY MEDICINE | Facility: CLINIC | Age: 46
End: 2021-03-17
Payer: MEDICARE

## 2021-03-17 VITALS
WEIGHT: 213 LBS | HEIGHT: 74 IN | OXYGEN SATURATION: 97 % | RESPIRATION RATE: 16 BRPM | BODY MASS INDEX: 27.34 KG/M2 | TEMPERATURE: 98.3 F | DIASTOLIC BLOOD PRESSURE: 82 MMHG | SYSTOLIC BLOOD PRESSURE: 124 MMHG | HEART RATE: 82 BPM

## 2021-03-17 LAB — HBA1C MFR BLD HPLC: 10

## 2021-03-17 PROCEDURE — 99214 OFFICE O/P EST MOD 30 MIN: CPT | Mod: 25

## 2021-03-17 PROCEDURE — 83036 HEMOGLOBIN GLYCOSYLATED A1C: CPT | Mod: QW

## 2021-03-17 NOTE — HEALTH RISK ASSESSMENT
[Intercurrent ED visits] : went to ED [No] : In the past 12 months have you used drugs other than those required for medical reasons? No [No falls in past year] : Patient reported no falls in the past year [0] : 1) Little interest or pleasure doing things: Not at all (0) [] : No [de-identified] : FAUZIA [de-identified] : Psychiatry

## 2021-03-17 NOTE — HISTORY OF PRESENT ILLNESS
[FreeTextEntry1] : DM f/up [de-identified] : Pt w/ Uncontrolled DM, Schizophrenia, presents today for f/up in DM .\par Pt on metformin, farxiga,and Glipizide .Intolerant to metformin due to persistent diarrhea.\par Not checking glucose at home.\par Pt seen by psychiatry, Dr Bazan on  Abilify and Zyprexa.\par \par Pt not checking Glucose at home. Last HbA1c: 8.8%

## 2021-03-17 NOTE — ASSESSMENT
[FreeTextEntry1] : PT presents today for f/up in uncontrolled DM,\par \par Schizophrenia:\par -Now on Abilify and Ziprexa.\par -Continue Psychiatry f/up with Dr Bazan\par \par -CPE: 11/19\par -HIV: screening 2016.\par \par -DM:Not well control\par HbA1c :10.0%\par D/C metformin due to persistent diarrhea.\par On  Farxiga 10mg daily.\par Increase  glipizide 5mg  to 10mg bid\par Intolerance to multiple meds: januvia,amaryl and Actos.Refused Invokana, after TV commercial.\par Endocrinology referral.: on hold.\par Dietitian vera. pending.\par \par -Schizophrenia: continue meds.\par  f/up w/ psychiatry Dr Bazan at [facility].\par \par -Dyslipidemia:Now on Lipitor.\par \par -Podiatry: seen 4 months ago\par \par -opthalmology: seen < 1 year ago. \par \par f/up in 2 months.\par

## 2021-05-19 ENCOUNTER — NON-APPOINTMENT (OUTPATIENT)
Age: 46
End: 2021-05-19

## 2021-05-19 ENCOUNTER — APPOINTMENT (OUTPATIENT)
Dept: FAMILY MEDICINE | Facility: CLINIC | Age: 46
End: 2021-05-19
Payer: MEDICARE

## 2021-05-19 VITALS
BODY MASS INDEX: 27.46 KG/M2 | DIASTOLIC BLOOD PRESSURE: 70 MMHG | TEMPERATURE: 97.9 F | RESPIRATION RATE: 12 BRPM | HEIGHT: 74 IN | HEART RATE: 87 BPM | OXYGEN SATURATION: 97 % | SYSTOLIC BLOOD PRESSURE: 122 MMHG | WEIGHT: 214 LBS

## 2021-05-19 DIAGNOSIS — R53.83 OTHER FATIGUE: ICD-10-CM

## 2021-05-19 DIAGNOSIS — R42 DIZZINESS AND GIDDINESS: ICD-10-CM

## 2021-05-19 LAB
GLUCOSE BLDC GLUCOMTR-MCNC: 197
HBA1C MFR BLD HPLC: 9

## 2021-05-19 PROCEDURE — 36415 COLL VENOUS BLD VENIPUNCTURE: CPT

## 2021-05-19 PROCEDURE — 83036 HEMOGLOBIN GLYCOSYLATED A1C: CPT | Mod: QW

## 2021-05-19 PROCEDURE — 99214 OFFICE O/P EST MOD 30 MIN: CPT | Mod: 25

## 2021-05-19 PROCEDURE — 93000 ELECTROCARDIOGRAM COMPLETE: CPT

## 2021-05-19 RX ORDER — MULTIVITAMIN/IRON/FOLIC ACID 18MG-0.4MG
TABLET ORAL
Refills: 0 | Status: ACTIVE | COMMUNITY
Start: 2021-05-19

## 2021-05-19 RX ORDER — TADALAFIL 2.5 MG/1
2.5 TABLET, FILM COATED ORAL
Qty: 90 | Refills: 1 | Status: DISCONTINUED | COMMUNITY
Start: 2017-02-08 | End: 2021-05-19

## 2021-05-19 NOTE — HISTORY OF PRESENT ILLNESS
[FreeTextEntry1] : DM f/up [de-identified] : Pt w/ Uncontrolled DM, Schizophrenia, presents today for f/up in DM .\par Pt on metformin, farxiga,and Glipizide .Intolerant to metformin due to persistent diarrhea.\par Not checking glucose at home.\par Pt seen by psychiatry, Dr Bazan on  Abilify and Zyprexa.\par Reports to feel with decrease energy, lack of motivation, decreased concentration, dizziness without mood symptoms for aprox 3 weeks. he was evaluated yesterday by psychiatry dr Bazan.\par Pt not checking Glucose at home. Last HbA1c: 8.8% [FreeTextEntry8] : Pt w/ Uncontrolled DM, Schizophrenia, presents today for f/up in DM.\par Pt on metformin, farxiga,and Glipizide.Intolerant to metformin due to persistent diarrhea.\par Not checking glucose at home.\par Pt seen by psychiatry, Dr Bazan on Abilify and Zyprexa.\par Reports to feel with decrease energy, lack of motivation, decreased concentration, dizziness without mood symptoms for aprox 3 weeks. he was evaluated yesterday by psychiatry dr Bazan.\par Pt not checking Glucose at home. Last HbA1c: 8.8% \par

## 2021-05-19 NOTE — ASSESSMENT
[FreeTextEntry1] : PT presents today for evaluation not feeling well for 3 weeks. Evaluated by Psychiatry Dr Bazan yesterday advise clinical eval.\par \par Dizziness/ fatigue:\par -EKG: NSR\par -HbA1c: 9%\par -Glucose 197\par -Blood drawn today\par \par Schizophrenia:\par -Now on Abilify and Ziprexa.\par -Continue Psychiatry f/up with Dr Bazan\par \par -CPE: 11/19\par -HIV: screening 2016.\par \par -DM:Not well control\par HbA1c :9%\par D/C metformin due to persistent diarrhea.\par On Farxiga 10mg daily.\par Continue Glipizide 10mg bid\par reassume metformin until Endocrinology eval.\par Intolerance to multiple meds: januvia,amaryl and Actos.Refused Invokana, after TV commercial.\par Endocrinology referral.\par Dietitian eval. pending.\par \par -Schizophrenia: continue meds.\par  f/up w/ psychiatry Dr Bazan at [facility].\par \par -Dyslipidemia:Now on Lipitor.\par \par -Podiatry: seen 4 months ago\par \par -opthalmology: seen < 1 year ago. \par \par f/up in 2 months.\par

## 2021-05-19 NOTE — ASSESSMENT
[FreeTextEntry1] : PT presents today for evaluation not feeling well for 3 weeks. Evaluated by Psychiatry Dr Bazan yesterday advise clinical eval.\par \par Dizziness/ fatigue:\par -EKG: NSR\par -HbA1c: 9%\par -Glucose 197\par -Blood drawn today\par \par Schizophrenia:\par -Now on Abilify and Ziprexa.\par -Continue Psychiatry f/up with Dr Bazan\par \par -CPE: 11/19\par -HIV: screening 2016.\par \par -DM:Not well control\par HbA1c :9%\par D/C metformin due to persistent diarrhea.\par On Farxiga 10mg daily.\par Continue Glipizide 10mg bid\par reassume metformin until Endocrinology eval.\par Intolerance to multiple meds: januvia,amaryl and Actos.Refused Invokana, after TV commercial.\par Endocrinology referral.\par Dietitian eval. pending.\par \par -Schizophrenia: continue meds.\par  f/up w/ psychiatry Dr Bazan at [facility].\par \par -Dyslipidemia:Now on Lipitor.\par \par -Podiatry: seen 4 months ago\par \par -opthalmology: seen < 1 year ago. \par \par f/up in 2 months.\par

## 2021-05-19 NOTE — HEALTH RISK ASSESSMENT
[Intercurrent ED visits] : went to ED [0] : 1) Little interest or pleasure doing things: Not at all (0) [de-identified] : FAUZIA [de-identified] : Psychiatry [No] : In the past 12 months have you used drugs other than those required for medical reasons? No [No falls in past year] : Patient reported no falls in the past year [] : No

## 2021-05-19 NOTE — HISTORY OF PRESENT ILLNESS
[FreeTextEntry1] : DM f/up [de-identified] : Pt w/ Uncontrolled DM, Schizophrenia, presents today for f/up in DM .\par Pt on metformin, farxiga,and Glipizide .Intolerant to metformin due to persistent diarrhea.\par Not checking glucose at home.\par Pt seen by psychiatry, Dr Bazan on  Abilify and Zyprexa.\par Reports to feel with decrease energy, lack of motivation, decreased concentration, dizziness without mood symptoms for aprox 3 weeks. he was evaluated yesterday by psychiatry dr Bazan.\par Pt not checking Glucose at home. Last HbA1c: 8.8% [FreeTextEntry8] : Pt w/ Uncontrolled DM, Schizophrenia, presents today for f/up in DM.\par Pt on metformin, farxiga,and Glipizide.Intolerant to metformin due to persistent diarrhea.\par Not checking glucose at home.\par Pt seen by psychiatry, Dr Bazan on Abilify and Zyprexa.\par Reports to feel with decrease energy, lack of motivation, decreased concentration, dizziness without mood symptoms for aprox 3 weeks. he was evaluated yesterday by psychiatry dr Bazan.\par Pt not checking Glucose at home. Last HbA1c: 8.8% \par

## 2021-05-19 NOTE — HEALTH RISK ASSESSMENT
[Intercurrent ED visits] : went to ED [0] : 1) Little interest or pleasure doing things: Not at all (0) [de-identified] : FAUZIA [de-identified] : Psychiatry [No] : In the past 12 months have you used drugs other than those required for medical reasons? No [No falls in past year] : Patient reported no falls in the past year [] : No

## 2021-05-21 LAB
ALBUMIN SERPL ELPH-MCNC: 4.7 G/DL
ALP BLD-CCNC: 82 U/L
ALT SERPL-CCNC: 21 U/L
ANION GAP SERPL CALC-SCNC: 14 MMOL/L
AST SERPL-CCNC: 16 U/L
BASOPHILS # BLD AUTO: 0.04 K/UL
BASOPHILS NFR BLD AUTO: 0.7 %
BILIRUB SERPL-MCNC: 0.2 MG/DL
BUN SERPL-MCNC: 18 MG/DL
CALCIUM SERPL-MCNC: 10 MG/DL
CHLORIDE SERPL-SCNC: 101 MMOL/L
CO2 SERPL-SCNC: 26 MMOL/L
CREAT SERPL-MCNC: 1.05 MG/DL
CREAT SPEC-SCNC: 85 MG/DL
CREAT/PROT UR: 0.1 RATIO
EOSINOPHIL # BLD AUTO: 0.15 K/UL
EOSINOPHIL NFR BLD AUTO: 2.6 %
GLUCOSE SERPL-MCNC: 220 MG/DL
HCT VFR BLD CALC: 50.4 %
HGB BLD-MCNC: 16.4 G/DL
IMM GRANULOCYTES NFR BLD AUTO: 0.2 %
LYMPHOCYTES # BLD AUTO: 2.29 K/UL
LYMPHOCYTES NFR BLD AUTO: 39.8 %
MAN DIFF?: NORMAL
MCHC RBC-ENTMCNC: 30 PG
MCHC RBC-ENTMCNC: 32.5 GM/DL
MCV RBC AUTO: 92.3 FL
MONOCYTES # BLD AUTO: 0.41 K/UL
MONOCYTES NFR BLD AUTO: 7.1 %
NEUTROPHILS # BLD AUTO: 2.85 K/UL
NEUTROPHILS NFR BLD AUTO: 49.6 %
PLATELET # BLD AUTO: 271 K/UL
POTASSIUM SERPL-SCNC: 4.4 MMOL/L
PROT SERPL-MCNC: 7.9 G/DL
PROT UR-MCNC: 10 MG/DL
RBC # BLD: 5.46 M/UL
RBC # FLD: 13.8 %
SODIUM SERPL-SCNC: 140 MMOL/L
TSH SERPL-ACNC: 3.03 UIU/ML
WBC # FLD AUTO: 5.75 K/UL

## 2021-05-23 ENCOUNTER — RX RENEWAL (OUTPATIENT)
Age: 46
End: 2021-05-23

## 2021-06-03 ENCOUNTER — APPOINTMENT (OUTPATIENT)
Dept: FAMILY MEDICINE | Facility: CLINIC | Age: 46
End: 2021-06-03
Payer: MEDICARE

## 2021-06-03 VITALS
TEMPERATURE: 98 F | OXYGEN SATURATION: 97 % | DIASTOLIC BLOOD PRESSURE: 70 MMHG | WEIGHT: 214 LBS | RESPIRATION RATE: 16 BRPM | HEIGHT: 74 IN | BODY MASS INDEX: 27.46 KG/M2 | HEART RATE: 90 BPM | SYSTOLIC BLOOD PRESSURE: 118 MMHG

## 2021-06-03 LAB — HBA1C MFR BLD HPLC: 10.1

## 2021-06-03 PROCEDURE — G0439: CPT

## 2021-06-03 PROCEDURE — 36415 COLL VENOUS BLD VENIPUNCTURE: CPT

## 2021-06-03 PROCEDURE — 83036 HEMOGLOBIN GLYCOSYLATED A1C: CPT | Mod: QW

## 2021-06-03 NOTE — HEALTH RISK ASSESSMENT
[Good] : ~his/her~  mood as  good [No] : In the past 12 months have you used drugs other than those required for medical reasons? No [] : No

## 2021-06-03 NOTE — ASSESSMENT
[FreeTextEntry1] : -CPE: \par -Blood and UA done 5/19/21\par -Lipid profile today\par -HIV: screening 2016.\par \par -DM:Not well control\par HbA1c :10.1%> refused Insulin or injectable meds.\par D/C metformin due to persistent diarrhea.\par On Farxiga 10mg daily.\par Continue Glipizide 10mg bid\par Increase metformin to 1000mg bid. Tolerating well.\par  Endocrinology eval. 8/2021 at [facility]\par Intolerance to multiple meds: januvia,amaryl and Actos.Refused Invokana, after TV commercial.\par Dietitian eval. pending.\par \par -Schizophrenia: continue meds.Abilify and Zyprexa\par  f/up w/ psychiatry Dr Bazan at [facility].\par \par -Dyslipidemia:Now on Lipitor.\par \par -Podiatry: seen 4 months ago\par \par -opthalmology: seen < 1 year ago. \par \par f/up in 2 months.\par

## 2021-06-03 NOTE — HISTORY OF PRESENT ILLNESS
[FreeTextEntry1] : Annual physical [de-identified] : Pt w/ Uncontrolled DM, Schizophrenia, presents today for CPE.\par Pt on metformin, farxiga,and Glipizide. reports tolerating well low dose metformin.\par Not checking glucose at home.\par Pt seen by psychiatry, Dr Bazan on Abilify and Zyprexa.\par \par Pt not checking Glucose at home. Last HbA1c: 9 % . States has appointment 8/9/21 with Endocrinology at [facility].\par Reports to feel very happy since recently moved to a 2nd floor apartment after living for 10 years in basement.\par hesitant about COVID vaccine.

## 2021-06-04 LAB
ALBUMIN SERPL ELPH-MCNC: 4.5 G/DL
ALP BLD-CCNC: 76 U/L
ALT SERPL-CCNC: 20 U/L
ANION GAP SERPL CALC-SCNC: 13 MMOL/L
APPEARANCE: CLEAR
AST SERPL-CCNC: 18 U/L
BASOPHILS # BLD AUTO: 0.03 K/UL
BASOPHILS NFR BLD AUTO: 0.5 %
BILIRUB SERPL-MCNC: 0.3 MG/DL
BILIRUBIN URINE: NEGATIVE
BLOOD URINE: NEGATIVE
BUN SERPL-MCNC: 14 MG/DL
CALCIUM SERPL-MCNC: 9.6 MG/DL
CHLORIDE SERPL-SCNC: 99 MMOL/L
CO2 SERPL-SCNC: 24 MMOL/L
COLOR: NORMAL
CREAT SERPL-MCNC: 0.95 MG/DL
EOSINOPHIL # BLD AUTO: 0.18 K/UL
EOSINOPHIL NFR BLD AUTO: 3.2 %
GLUCOSE QUALITATIVE U: ABNORMAL
GLUCOSE SERPL-MCNC: 327 MG/DL
HCT VFR BLD CALC: 49.5 %
HGB BLD-MCNC: 15.9 G/DL
IMM GRANULOCYTES NFR BLD AUTO: 0.2 %
KETONES URINE: NEGATIVE
LEUKOCYTE ESTERASE URINE: NEGATIVE
LYMPHOCYTES # BLD AUTO: 1.99 K/UL
LYMPHOCYTES NFR BLD AUTO: 35.7 %
MAN DIFF?: NORMAL
MCHC RBC-ENTMCNC: 29.4 PG
MCHC RBC-ENTMCNC: 32.1 GM/DL
MCV RBC AUTO: 91.7 FL
MONOCYTES # BLD AUTO: 0.43 K/UL
MONOCYTES NFR BLD AUTO: 7.7 %
NEUTROPHILS # BLD AUTO: 2.93 K/UL
NEUTROPHILS NFR BLD AUTO: 52.7 %
NITRITE URINE: NEGATIVE
PH URINE: 7
PLATELET # BLD AUTO: 282 K/UL
POTASSIUM SERPL-SCNC: 4.2 MMOL/L
PROT SERPL-MCNC: 7.4 G/DL
PROTEIN URINE: NEGATIVE
RBC # BLD: 5.4 M/UL
RBC # FLD: 13.8 %
SODIUM SERPL-SCNC: 136 MMOL/L
SPECIFIC GRAVITY URINE: 1.03
UROBILINOGEN URINE: NORMAL
WBC # FLD AUTO: 5.57 K/UL

## 2021-06-07 LAB
25(OH)D3 SERPL-MCNC: 33 NG/ML
CHOLEST SERPL-MCNC: 157 MG/DL
CREAT SPEC-SCNC: 58 MG/DL
HDLC SERPL-MCNC: 51 MG/DL
LDLC SERPL CALC-MCNC: 66 MG/DL
MICROALBUMIN 24H UR DL<=1MG/L-MCNC: <1.2 MG/DL
MICROALBUMIN/CREAT 24H UR-RTO: NORMAL MG/G
NONHDLC SERPL-MCNC: 106 MG/DL
TRIGL SERPL-MCNC: 199 MG/DL
TSH SERPL-ACNC: 2.86 UIU/ML

## 2021-08-18 ENCOUNTER — APPOINTMENT (OUTPATIENT)
Dept: FAMILY MEDICINE | Facility: CLINIC | Age: 46
End: 2021-08-18
Payer: MEDICARE

## 2021-08-18 VITALS
HEIGHT: 74 IN | DIASTOLIC BLOOD PRESSURE: 80 MMHG | OXYGEN SATURATION: 98 % | TEMPERATURE: 97.9 F | HEART RATE: 89 BPM | WEIGHT: 205 LBS | RESPIRATION RATE: 16 BRPM | BODY MASS INDEX: 26.31 KG/M2 | SYSTOLIC BLOOD PRESSURE: 122 MMHG

## 2021-08-18 DIAGNOSIS — Z79.899 OTHER LONG TERM (CURRENT) DRUG THERAPY: ICD-10-CM

## 2021-08-18 PROCEDURE — 99214 OFFICE O/P EST MOD 30 MIN: CPT

## 2021-08-18 PROCEDURE — 83036 HEMOGLOBIN GLYCOSYLATED A1C: CPT | Mod: QW

## 2021-08-18 RX ORDER — LANCETS
EACH MISCELLANEOUS
Qty: 1 | Refills: 1 | Status: ACTIVE | COMMUNITY
Start: 2021-08-18 | End: 1900-01-01

## 2021-08-18 RX ORDER — BLOOD SUGAR DIAGNOSTIC
STRIP MISCELLANEOUS
Qty: 1 | Refills: 2 | Status: ACTIVE | COMMUNITY
Start: 2021-08-18 | End: 1900-01-01

## 2021-08-18 NOTE — ASSESSMENT
[FreeTextEntry1] : PT presents today for f/up in uncontrolled DM,\par \par Schizophrenia:\par -Now on Abilify\par -D/C Ziprexa.\par -Continue Psychiatry f/up with Dr Bazan\par \par -CPE: 06/2021\par -HIV: screening 2016.\par \par -DM:Better control\par HbA1c :8.1%\par on metformin 500mg bid.\par On  Farxiga 10mg daily.\par D/C  glipizide due to Hypoglycemias\par Intolerance to multiple meds: januvia,amaryl and Actos.Refused Invokana, after TV commercial.\par Endocrinology referral.: on hold.\par Dietitian vera. pending.\par \par -Schizophrenia: continue meds.\par  f/up w/ psychiatry Dr Bazan at [facility].\par \par -Dyslipidemia:Now on Lipitor.\par \par -Podiatry: seen 4 months ago\par \par -opthalmology: seen < 1 year ago. \par \par f/up in 2 months.\par

## 2021-08-18 NOTE — HISTORY OF PRESENT ILLNESS
[FreeTextEntry1] : DM f/up [de-identified] : Pt w/ Uncontrolled DM, Schizophrenia, presents today for f/up in DM .\par Pt on metformin, farxiga,states d/c Glipizide due to hypoglycemia. .Intolerant to metformin due to persistent diarrhea.\par Checking glucose at home: 128-130. States has been more compliant with diet.\par Pt seen by psychiatry, Dr Bazan on  Abilify and Zyprexa. States d/c Zyprexa for 1 month and is feeling better\par \par Pt not checking Glucose at home. Last HbA1c: > 10%\par States was seen by Endocrinology at [facility].

## 2021-08-18 NOTE — HEALTH RISK ASSESSMENT
[Intercurrent ED visits] : went to ED [No] : In the past 12 months have you used drugs other than those required for medical reasons? No [No falls in past year] : Patient reported no falls in the past year [0] : 1) Little interest or pleasure doing things: Not at all (0) [] : No [de-identified] : FAUZIA [de-identified] : Psychiatry

## 2021-09-07 ENCOUNTER — RX RENEWAL (OUTPATIENT)
Age: 46
End: 2021-09-07

## 2021-10-20 ENCOUNTER — APPOINTMENT (OUTPATIENT)
Dept: FAMILY MEDICINE | Facility: CLINIC | Age: 46
End: 2021-10-20
Payer: MEDICARE

## 2021-10-20 VITALS
OXYGEN SATURATION: 98 % | BODY MASS INDEX: 26.05 KG/M2 | SYSTOLIC BLOOD PRESSURE: 122 MMHG | TEMPERATURE: 98.3 F | HEIGHT: 74 IN | RESPIRATION RATE: 16 BRPM | DIASTOLIC BLOOD PRESSURE: 74 MMHG | HEART RATE: 84 BPM | WEIGHT: 203 LBS

## 2021-10-20 DIAGNOSIS — Z71.85 ENCOUNTER FOR IMMUNIZATION SAFETY COUNSELING: ICD-10-CM

## 2021-10-20 PROCEDURE — 99214 OFFICE O/P EST MOD 30 MIN: CPT

## 2021-10-20 RX ORDER — GLIPIZIDE 10 MG/1
10 TABLET ORAL
Qty: 180 | Refills: 0 | Status: DISCONTINUED | COMMUNITY
Start: 2020-05-22 | End: 2021-10-20

## 2021-10-20 NOTE — ASSESSMENT
[FreeTextEntry1] : PT presents today for f/up in uncontrolled DM,\par \par Schizophrenia:\par -Now on Abilify\par -Off Ziprexa.> 3 months\par -Continue Psychiatry f/up with Dr Bazan\par \par -CPE: 06/2021\par -HIV: screening 2016.\par \par -DM:Better control\par HbA1c :8.2%\par on metformin 500mg bid.\par On  Farxiga 10mg daily.\par D/C  glipizide due to Hypoglycemias\par Intolerance to multiple meds: januvia,amaryl and Actos.Refused Invokana, after TV commercial.\par Seen by Endocrinology at [facility].\par \par -Schizophrenia: continue meds.\par  f/up w/ psychiatry Dr Bazan at [facility].\par \par -Dyslipidemia:Now on Lipitor.\par \par -Podiatry: seen 4 months ago\par \par -opthalmology: seen < 1 year ago. \par \par Immunization counseling for COVID vaccine\par f/up in 2 months.\par

## 2021-10-20 NOTE — HEALTH RISK ASSESSMENT
[Intercurrent ED visits] : went to ED [No] : In the past 12 months have you used drugs other than those required for medical reasons? No [No falls in past year] : Patient reported no falls in the past year [0] : 1) Little interest or pleasure doing things: Not at all (0) [] : No [de-identified] : FAUZIA [de-identified] : Psychiatry

## 2021-10-20 NOTE — HISTORY OF PRESENT ILLNESS
[FreeTextEntry1] : DM f/up [de-identified] : Pt w/ Uncontrolled DM, Schizophrenia, presents today for f/up in DM . Now on metformin and Farxiga.\par last HbA1c: 8.1%\par Not Checking glucose at home. States has been more compliant with diet.\par Pt seen by psychiatry, Dr Bazan on  Abilify, off Zyprexa for > 3 months.\par States was seen by Endocrinology at [facility].\par Refused COVID vaccine.

## 2021-10-20 NOTE — PHYSICAL EXAM

## 2021-10-21 ENCOUNTER — RESULT CHARGE (OUTPATIENT)
Age: 46
End: 2021-10-21

## 2021-12-20 ENCOUNTER — APPOINTMENT (OUTPATIENT)
Dept: FAMILY MEDICINE | Facility: CLINIC | Age: 46
End: 2021-12-20

## 2022-02-09 ENCOUNTER — APPOINTMENT (OUTPATIENT)
Dept: FAMILY MEDICINE | Facility: CLINIC | Age: 47
End: 2022-02-09
Payer: MEDICARE

## 2022-02-09 VITALS
TEMPERATURE: 97.2 F | DIASTOLIC BLOOD PRESSURE: 80 MMHG | RESPIRATION RATE: 16 BRPM | BODY MASS INDEX: 26.56 KG/M2 | HEART RATE: 94 BPM | WEIGHT: 207 LBS | SYSTOLIC BLOOD PRESSURE: 126 MMHG | HEIGHT: 74 IN | OXYGEN SATURATION: 98 %

## 2022-02-09 LAB — HBA1C MFR BLD HPLC: 10

## 2022-02-09 PROCEDURE — 36415 COLL VENOUS BLD VENIPUNCTURE: CPT

## 2022-02-09 PROCEDURE — 99214 OFFICE O/P EST MOD 30 MIN: CPT | Mod: 25

## 2022-02-09 PROCEDURE — 83036 HEMOGLOBIN GLYCOSYLATED A1C: CPT | Mod: QW

## 2022-02-09 NOTE — ASSESSMENT
[FreeTextEntry1] : PT presents today for f/up in uncontrolled DM,\par \par Schizophrenia:\par -Now on Abilify\par -Off Ziprexa.> 3 months\par -Continue Psychiatry f/up with Dr Bazan\par \par -CPE: 06/2021\par -HIV: screening 2016.\par \par -DM:Uncontrolled\par HbA1c :10%\par on metformin 500mg bid.(intolerant to 1000mg )\par On  Farxiga 10mg daily.\par Advise to start Rybelsius Rx at [facility].\par D/C  glipizide due to Hypoglycemias\par Intolerance to multiple meds: januvia,amaryl and Actos.Refused Invokana, after TV commercial.\par Seen by Endocrinology at [facility].\par \par -Schizophrenia: continue meds.\par  f/up w/ psychiatry Dr Bazan at [facility].\par \par -Dyslipidemia:Now on Lipitor.\par \par -Podiatry: seen 4 months ago\par \par -opthalmology: seen < 1 year ago. \par \par Immunization counseling for COVID vaccine\par f/up in 2 months.\par

## 2022-02-09 NOTE — HEALTH RISK ASSESSMENT
[Intercurrent ED visits] : went to ED [Never] : Never [No] : In the past 12 months have you used drugs other than those required for medical reasons? No [No falls in past year] : Patient reported no falls in the past year [0] : 1) Little interest or pleasure doing things: Not at all (0) [de-identified] : FAUZIA [de-identified] : Psychiatry

## 2022-02-09 NOTE — HISTORY OF PRESENT ILLNESS
[FreeTextEntry1] : DM f/up [de-identified] : Pt w/ Uncontrolled DM, Schizophrenia, presents today for f/up in DM . Now on metformin and Farxiga.\par last HbA1c: 8.1%\par Not Checking glucose at home. States has been more compliant with diet.\par Pt seen by psychiatry, Dr Bazan on  Abilify, off Zyprexa for > 3 months.\par States was seen by Endocrinology at [facility].\par He states was Rx Rybelsius and sample was provided for 1 month, and never started med.\par Refused COVID vaccine.

## 2022-02-10 LAB
ANION GAP SERPL CALC-SCNC: 17 MMOL/L
BUN SERPL-MCNC: 13 MG/DL
CALCIUM SERPL-MCNC: 10.4 MG/DL
CHLORIDE SERPL-SCNC: 98 MMOL/L
CO2 SERPL-SCNC: 26 MMOL/L
CREAT SERPL-MCNC: 0.82 MG/DL
GLUCOSE SERPL-MCNC: 195 MG/DL
POTASSIUM SERPL-SCNC: 4.4 MMOL/L
SODIUM SERPL-SCNC: 141 MMOL/L

## 2022-04-06 ENCOUNTER — APPOINTMENT (OUTPATIENT)
Dept: FAMILY MEDICINE | Facility: CLINIC | Age: 47
End: 2022-04-06

## 2022-05-13 ENCOUNTER — APPOINTMENT (OUTPATIENT)
Dept: FAMILY MEDICINE | Facility: CLINIC | Age: 47
End: 2022-05-13
Payer: MEDICARE

## 2022-05-13 VITALS
HEART RATE: 100 BPM | RESPIRATION RATE: 16 BRPM | BODY MASS INDEX: 26.56 KG/M2 | DIASTOLIC BLOOD PRESSURE: 72 MMHG | OXYGEN SATURATION: 98 % | TEMPERATURE: 98 F | WEIGHT: 207 LBS | SYSTOLIC BLOOD PRESSURE: 122 MMHG | HEIGHT: 74 IN

## 2022-05-13 LAB — HBA1C MFR BLD HPLC: 10.8

## 2022-05-13 PROCEDURE — 83036 HEMOGLOBIN GLYCOSYLATED A1C: CPT | Mod: QW

## 2022-05-13 PROCEDURE — 99214 OFFICE O/P EST MOD 30 MIN: CPT | Mod: 25

## 2022-05-13 NOTE — ASSESSMENT
[FreeTextEntry1] : PT presents today for f/up in uncontrolled DM,\par \par Schizophrenia:\par -Now on Abilify\par -Off Ziprexa.> 3 months> now back on\par -Continue Psychiatry f/up with Dr Bazan\par \par -CPE: 06/2021\par -HIV: screening 2016.\par \par -DM:Uncontrolled\par HbA1c :10.8%\par on metformin 500mg bid.(intolerant to 1000mg )\par On  Farxiga 10mg daily.\par Advise to start Rybesius\par Pt will call office if Rybelsius in not cover by Insurance to try Ozempic\par D/C  glipizide due to Hypoglycemias\par Intolerance to multiple meds: januvia,amaryl and Actos.Refused Invokana, after TV commercial.\par Seen by Endocrinology at [facility].\par \par -Schizophrenia: continue meds.\par  f/up w/ psychiatry Dr Bazan at [facility].\par \par -Dyslipidemia:Now on Lipitor.\par \par -Podiatry: seen 4 months ago\par \par -opthalmology: seen < 1 year ago. \par \par Immunization counseling for COVID vaccine\par f/up in 2 months.\par

## 2022-05-13 NOTE — HEALTH RISK ASSESSMENT
[Intercurrent ED visits] : went to ED [Never] : Never [No] : In the past 12 months have you used drugs other than those required for medical reasons? No [No falls in past year] : Patient reported no falls in the past year [0] : 1) Little interest or pleasure doing things: Not at all (0) [de-identified] : FAUZIA [de-identified] : Psychiatry

## 2022-05-13 NOTE — HISTORY OF PRESENT ILLNESS
[FreeTextEntry1] : DM f/up [de-identified] : Pt w/ Uncontrolled DM, Schizophrenia, presents today for f/up in DM . Now on metformin and Farxiga.\par last HbA1c: 10%\par Not Checking glucose at home. States has been more compliant with diet.\par Pt seen by psychiatry, Dr Bazan on  Abilify, off Zyprexa for > 3 months.He states is now back on Zyprexa.\par States was seen by Endocrinology at [facility].\par He states was Rx Rybelsius and sample was provided for 1 month, and never started med.\par Refused COVID vaccine.

## 2022-06-24 ENCOUNTER — APPOINTMENT (OUTPATIENT)
Dept: FAMILY MEDICINE | Facility: CLINIC | Age: 47
End: 2022-06-24

## 2022-08-24 NOTE — ED ADULT TRIAGE NOTE - NS ED TRIAGE AVPU SCALE
Alert-The patient is alert, awake and responds to voice. The patient is oriented to time, place, and person. The triage nurse is able to obtain subjective information.
I have personally provided the amount of critical care time documented below excluding time spent on separate procedures.

## 2022-09-08 ENCOUNTER — RESULT CHARGE (OUTPATIENT)
Age: 47
End: 2022-09-08

## 2022-09-08 ENCOUNTER — APPOINTMENT (OUTPATIENT)
Dept: FAMILY MEDICINE | Facility: CLINIC | Age: 47
End: 2022-09-08

## 2022-09-08 VITALS
SYSTOLIC BLOOD PRESSURE: 122 MMHG | HEIGHT: 74 IN | RESPIRATION RATE: 14 BRPM | WEIGHT: 200 LBS | TEMPERATURE: 96.6 F | BODY MASS INDEX: 25.67 KG/M2 | OXYGEN SATURATION: 98 % | DIASTOLIC BLOOD PRESSURE: 80 MMHG | HEART RATE: 110 BPM

## 2022-09-08 PROCEDURE — 99214 OFFICE O/P EST MOD 30 MIN: CPT | Mod: 25

## 2022-09-08 PROCEDURE — 83036 HEMOGLOBIN GLYCOSYLATED A1C: CPT | Mod: QW

## 2022-09-08 RX ORDER — ORAL SEMAGLUTIDE 3 MG/1
3 TABLET ORAL
Qty: 90 | Refills: 0 | Status: DISCONTINUED | COMMUNITY
Start: 2022-05-13 | End: 2022-09-08

## 2022-09-08 NOTE — HEALTH RISK ASSESSMENT
[Intercurrent ED visits] : went to ED [Never] : Never [No] : In the past 12 months have you used drugs other than those required for medical reasons? No [No falls in past year] : Patient reported no falls in the past year [0] : 2) Feeling down, depressed, or hopeless: Not at all (0) [PHQ-2 Negative - No further assessment needed] : PHQ-2 Negative - No further assessment needed [de-identified] : FAUZIA [de-identified] : Psychiatry [[id number]] : 0

## 2022-09-08 NOTE — ASSESSMENT
[FreeTextEntry1] : PT presents today for f/up in uncontrolled DM,\par \par Schizophrenia:> Hospitalized at [facility] 1 month. D/C 7/26/22\par -Now on Abilify\par -Off Ziprexa.> 1 months> \par -Continue Psychiatry f/up with Dr Bazan\par \par -CPE: 06/2021\par -HIV: screening 2016.\par \par -DM:Uncontrolled\par HbA1c :8.7%\par on metformin 500mg bid.(intolerant to 1000mg )\par Reassume  Farxiga 10mg daily.(sample given)\par Did not tolerate Rybesius\par D/C  glipizide due to Hypoglycemias\par Intolerance to multiple meds: januvia,amaryl and Actos.Refused Invokana, after TV commercial.\par Seen by Endocrinology at [facility].\par \par -Schizophrenia: continue meds.\par  f/up w/ psychiatry Dr Bazan at [facility].\par \par -Dyslipidemia:Now on Lipitor.\par \par -Podiatry: seen 4 months ago\par \par -opthalmology: seen < 1 year ago. \par \par Immunization counseling for COVID vaccine\par f/up in 2 months.\par

## 2022-09-08 NOTE — HISTORY OF PRESENT ILLNESS
[FreeTextEntry1] : DM f/up [de-identified] : Pt w/ Uncontrolled DM, Schizophrenia, presents today for f/up in DM . Now on metformin, Off Farxiga x 3 weeks.\par last HbA1c: 10.8%.\par Pt was hospitalized at [facility] for 4 weeks, d/c on 7/26/22 for Bipolar/ Eschizophrenia.\par Not Checking glucose at home. States has been more compliant with diet.\par Pt seen by psychiatry, Dr Bazan on  Abilify, off Zyprexa for > 1 month.\par States was seen by Endocrinology at [facility].\par He did not tolerate Rybelsius (blurred vison and dizzy).\par Refused COVID vaccine.

## 2022-11-16 ENCOUNTER — APPOINTMENT (OUTPATIENT)
Dept: FAMILY MEDICINE | Facility: CLINIC | Age: 47
End: 2022-11-16

## 2022-11-16 ENCOUNTER — RESULT CHARGE (OUTPATIENT)
Age: 47
End: 2022-11-16

## 2022-11-16 VITALS
TEMPERATURE: 97.9 F | BODY MASS INDEX: 25.41 KG/M2 | DIASTOLIC BLOOD PRESSURE: 70 MMHG | OXYGEN SATURATION: 97 % | SYSTOLIC BLOOD PRESSURE: 130 MMHG | HEIGHT: 74 IN | WEIGHT: 198 LBS | RESPIRATION RATE: 14 BRPM | HEART RATE: 89 BPM

## 2022-11-16 DIAGNOSIS — E13.649 OTHER SPECIFIED DIABETES MELLITUS WITH HYPOGLYCEMIA W/OUT COMA: ICD-10-CM

## 2022-11-16 PROCEDURE — 99214 OFFICE O/P EST MOD 30 MIN: CPT | Mod: 25

## 2022-11-16 PROCEDURE — 36415 COLL VENOUS BLD VENIPUNCTURE: CPT

## 2022-11-16 RX ORDER — ATORVASTATIN CALCIUM 20 MG/1
20 TABLET, FILM COATED ORAL DAILY
Qty: 90 | Refills: 1 | Status: ACTIVE | COMMUNITY
Start: 2019-02-01 | End: 1900-01-01

## 2022-11-16 RX ORDER — ARIPIPRAZOLE 15 MG/1
15 TABLET ORAL
Refills: 0 | Status: ACTIVE | COMMUNITY
Start: 2019-11-13

## 2022-11-16 NOTE — HISTORY OF PRESENT ILLNESS
[FreeTextEntry1] : DM f/up [de-identified] : Pt w/ Uncontrolled DM, Schizophrenia, presents today for f/up in DM . Now on metformin,and Farxiga.\par last HbA1c: 8.6%.\par Pt was hospitalized at [facility] for 4 weeks, d/c on 7/26/22 for Bipolar/ Eschizophrenia.\par Not Checking glucose at home. States has been more compliant with diet.\par Pt seen by psychiatry, Dr Bazan on  Abilify, off Zyprexa for > 1 month.\par Seen in the past by Endocrinology at [facility].\par He did not tolerate Rybelsius (blurred vison and dizzy).\par Refused COVID vaccine.

## 2022-11-16 NOTE — ASSESSMENT
[FreeTextEntry1] : PT presents today for f/up in uncontrolled DM,\par \par Schizophrenia:> Hospitalized at [facility] 1 month. D/C 7/26/22\par -Now on Abilify\par -Off Ziprexa.> 1 months> \par -Continue Psychiatry f/up with Dr Bazan\par \par -CPE: 06/2021\par -HIV: screening 2016.\par \par -DM:Uncontrolled\par HbA1c :8.5%\par on metformin 500mg bid.(intolerant to 1000mg )\par Continue  Farxiga 10mg daily.\par Did not tolerate Rybesius\par D/C  glipizide due to Hypoglycemias\par Intolerance to multiple meds: januvia,amaryl and Actos.Refused Invokana, after TV commercial.\par Seen in the past by Endocrinology at [facility].\par \par -Schizophrenia: continue meds.\par  f/up w/ psychiatry Dr Bazan at [facility].\par \par -Dyslipidemia:Now on Lipitor.\par \par -Podiatry: seen 4 months ago\par \par -opthalmology: seen < 1 year ago. \par \par Immunization counseling for COVID vaccine\par f/up in 2 months.\par Blood and UA done today\par

## 2022-11-16 NOTE — HEALTH RISK ASSESSMENT
[Intercurrent ED visits] : went to ED [Never] : Never [No] : In the past 12 months have you used drugs other than those required for medical reasons? No [No falls in past year] : Patient reported no falls in the past year [0] : 2) Feeling down, depressed, or hopeless: Not at all (0) [PHQ-2 Negative - No further assessment needed] : PHQ-2 Negative - No further assessment needed [de-identified] : FAUZIA [de-identified] : Psychiatry [[id number]] : 0

## 2022-11-17 LAB
ALBUMIN SERPL ELPH-MCNC: 4.9 G/DL
ALP BLD-CCNC: 71 U/L
ALT SERPL-CCNC: 16 U/L
ANION GAP SERPL CALC-SCNC: 12 MMOL/L
AST SERPL-CCNC: 12 U/L
BASOPHILS # BLD AUTO: 0.04 K/UL
BASOPHILS NFR BLD AUTO: 0.7 %
BILIRUB SERPL-MCNC: 0.3 MG/DL
BUN SERPL-MCNC: 14 MG/DL
CALCIUM SERPL-MCNC: 10.3 MG/DL
CHLORIDE SERPL-SCNC: 99 MMOL/L
CHOLEST SERPL-MCNC: 145 MG/DL
CO2 SERPL-SCNC: 26 MMOL/L
CREAT SERPL-MCNC: 0.87 MG/DL
CREAT SPEC-SCNC: 18 MG/DL
CREAT/PROT UR: 0.8 RATIO
EGFR: 106 ML/MIN/1.73M2
EOSINOPHIL # BLD AUTO: 0.09 K/UL
EOSINOPHIL NFR BLD AUTO: 1.6 %
GLUCOSE SERPL-MCNC: 187 MG/DL
HCT VFR BLD CALC: 49.1 %
HDLC SERPL-MCNC: 59 MG/DL
HGB BLD-MCNC: 16.2 G/DL
IMM GRANULOCYTES NFR BLD AUTO: 0.2 %
LDLC SERPL CALC-MCNC: 65 MG/DL
LYMPHOCYTES # BLD AUTO: 1.98 K/UL
LYMPHOCYTES NFR BLD AUTO: 36.1 %
MAN DIFF?: NORMAL
MCHC RBC-ENTMCNC: 30.1 PG
MCHC RBC-ENTMCNC: 33 GM/DL
MCV RBC AUTO: 91.1 FL
MONOCYTES # BLD AUTO: 0.41 K/UL
MONOCYTES NFR BLD AUTO: 7.5 %
NEUTROPHILS # BLD AUTO: 2.95 K/UL
NEUTROPHILS NFR BLD AUTO: 53.9 %
NONHDLC SERPL-MCNC: 86 MG/DL
PLATELET # BLD AUTO: 308 K/UL
POTASSIUM SERPL-SCNC: 4.5 MMOL/L
PROT SERPL-MCNC: 7.8 G/DL
PROT UR-MCNC: 14 MG/DL
RBC # BLD: 5.39 M/UL
RBC # FLD: 13.6 %
SODIUM SERPL-SCNC: 137 MMOL/L
TRIGL SERPL-MCNC: 106 MG/DL
WBC # FLD AUTO: 5.48 K/UL

## 2023-01-19 ENCOUNTER — APPOINTMENT (OUTPATIENT)
Dept: FAMILY MEDICINE | Facility: CLINIC | Age: 48
End: 2023-01-19

## 2023-05-18 ENCOUNTER — RX RENEWAL (OUTPATIENT)
Age: 48
End: 2023-05-18

## 2024-01-01 NOTE — ED CDU PROVIDER SUBSEQUENT DAY NOTE - NS ED ATTENDING STATEMENT MOD
Opt out I have personally performed a face to face diagnostic evaluation on this patient. I have reviewed the ACP note and agree with the history, exam and plan of care, except as noted.

## 2024-01-25 ENCOUNTER — APPOINTMENT (OUTPATIENT)
Dept: FAMILY MEDICINE | Facility: CLINIC | Age: 49
End: 2024-01-25
Payer: MEDICARE

## 2024-01-25 VITALS
HEIGHT: 74 IN | SYSTOLIC BLOOD PRESSURE: 120 MMHG | RESPIRATION RATE: 14 BRPM | DIASTOLIC BLOOD PRESSURE: 80 MMHG | OXYGEN SATURATION: 98 % | HEART RATE: 90 BPM | WEIGHT: 205 LBS | BODY MASS INDEX: 26.31 KG/M2

## 2024-01-25 DIAGNOSIS — Z00.00 ENCOUNTER FOR GENERAL ADULT MEDICAL EXAMINATION W/OUT ABNORMAL FINDINGS: ICD-10-CM

## 2024-01-25 PROCEDURE — 36415 COLL VENOUS BLD VENIPUNCTURE: CPT

## 2024-01-25 PROCEDURE — 99396 PREV VISIT EST AGE 40-64: CPT

## 2024-01-25 RX ORDER — ATORVASTATIN CALCIUM 20 MG/1
20 TABLET, FILM COATED ORAL
Qty: 90 | Refills: 1 | Status: ACTIVE | COMMUNITY
Start: 2018-12-06 | End: 1900-01-01

## 2024-01-26 LAB
ALBUMIN SERPL ELPH-MCNC: 4.6 G/DL
ALP BLD-CCNC: 115 U/L
ALT SERPL-CCNC: 32 U/L
ANION GAP SERPL CALC-SCNC: 14 MMOL/L
APPEARANCE: CLEAR
AST SERPL-CCNC: 22 U/L
BASOPHILS # BLD AUTO: 0.05 K/UL
BASOPHILS NFR BLD AUTO: 1 %
BILIRUB SERPL-MCNC: 0.3 MG/DL
BILIRUBIN URINE: NEGATIVE
BLOOD URINE: NEGATIVE
BUN SERPL-MCNC: 10 MG/DL
CALCIUM SERPL-MCNC: 9.7 MG/DL
CHLORIDE SERPL-SCNC: 96 MMOL/L
CHOLEST SERPL-MCNC: 275 MG/DL
CO2 SERPL-SCNC: 25 MMOL/L
COLOR: YELLOW
CREAT SERPL-MCNC: 0.79 MG/DL
EGFR: 108 ML/MIN/1.73M2
EOSINOPHIL # BLD AUTO: 0.13 K/UL
EOSINOPHIL NFR BLD AUTO: 2.6 %
ESTIMATED AVERAGE GLUCOSE: 364 MG/DL
GLUCOSE QUALITATIVE U: >=1000 MG/DL
GLUCOSE SERPL-MCNC: 367 MG/DL
HBA1C MFR BLD HPLC: 14.3 %
HCT VFR BLD CALC: 50.1 %
HDLC SERPL-MCNC: 64 MG/DL
HGB BLD-MCNC: 16.6 G/DL
IMM GRANULOCYTES NFR BLD AUTO: 0.2 %
KETONES URINE: ABNORMAL MG/DL
LDLC SERPL CALC-MCNC: 165 MG/DL
LEUKOCYTE ESTERASE URINE: NEGATIVE
LYMPHOCYTES # BLD AUTO: 1.88 K/UL
LYMPHOCYTES NFR BLD AUTO: 37.7 %
MAN DIFF?: NORMAL
MCHC RBC-ENTMCNC: 29.5 PG
MCHC RBC-ENTMCNC: 33.1 GM/DL
MCV RBC AUTO: 89.1 FL
MONOCYTES # BLD AUTO: 0.36 K/UL
MONOCYTES NFR BLD AUTO: 7.2 %
NEUTROPHILS # BLD AUTO: 2.56 K/UL
NEUTROPHILS NFR BLD AUTO: 51.3 %
NITRITE URINE: NEGATIVE
NONHDLC SERPL-MCNC: 211 MG/DL
PH URINE: 6.5
PLATELET # BLD AUTO: 307 K/UL
POTASSIUM SERPL-SCNC: 4.4 MMOL/L
PROT SERPL-MCNC: 7.9 G/DL
PROTEIN URINE: NEGATIVE MG/DL
RBC # BLD: 5.62 M/UL
RBC # FLD: 12.9 %
SODIUM SERPL-SCNC: 135 MMOL/L
SPECIFIC GRAVITY URINE: >1.03
TRIGL SERPL-MCNC: 250 MG/DL
TSH SERPL-ACNC: 3.02 UIU/ML
UROBILINOGEN URINE: 0.2 MG/DL
WBC # FLD AUTO: 4.99 K/UL

## 2024-03-04 ENCOUNTER — APPOINTMENT (OUTPATIENT)
Dept: FAMILY MEDICINE | Facility: CLINIC | Age: 49
End: 2024-03-04
Payer: MEDICARE

## 2024-03-04 VITALS
HEIGHT: 74 IN | SYSTOLIC BLOOD PRESSURE: 124 MMHG | RESPIRATION RATE: 14 BRPM | BODY MASS INDEX: 25.93 KG/M2 | HEART RATE: 92 BPM | OXYGEN SATURATION: 98 % | DIASTOLIC BLOOD PRESSURE: 80 MMHG | WEIGHT: 202 LBS

## 2024-03-04 PROCEDURE — 83036 HEMOGLOBIN GLYCOSYLATED A1C: CPT | Mod: QW

## 2024-03-04 PROCEDURE — 99214 OFFICE O/P EST MOD 30 MIN: CPT

## 2024-03-04 RX ORDER — PEN NEEDLE, DIABETIC 29 G X1/2"
32G X 4 MM NEEDLE, DISPOSABLE MISCELLANEOUS
Qty: 1 | Refills: 0 | Status: ACTIVE | COMMUNITY
Start: 2024-03-04 | End: 1900-01-01

## 2024-03-04 NOTE — ASSESSMENT
[FreeTextEntry1] : HCM: 01/2024 -Colon Cancer screening: GI referral -Immunizations: refused  # Schizophrenia:> Hospitalized at [facility] 1 month. D/C 7/26/22 -On  Abilify -On Ziprexa. -Psychiatry Dr Bazan in the past -States has new Psychiatry  # -DM:Uncontrolled -HbA1c :8.5%--> > 14%  -ON metformin 500mg bid.(intolerant to 1000mg ) -On  Farxiga 10mg daily. Did not tolerate Rybesius D/C glipizide due to Hypoglycemias Intolerance to multiple meds: januvia,amaryl and Actos.Refused Invokana, after TV commercial. Seen in the past by Endocrinology at [facility]. Diabetic education CGM education Start Lantus 15 Units qhs F/up in 4 weeks  # Dyslipidemia: -Restart Lipitor.  F/up in 4 weeks.

## 2024-03-04 NOTE — HISTORY OF PRESENT ILLNESS
[FreeTextEntry1] : DM f/up [de-identified] : Medical hx Schizophrenia on Zyprexa and Abilify DM not well control, Last HbA1c:>14. On Metformin and Farxiga.. Pt was hospitalized at [facility] for 4 weeks, d/c on 7/26/22 for Bipolar/ Eschizophrenia. Not Checking glucose at home. Pt seen by psychiatry, Dr Bazan in the past.States has a new Psychiatry Seen in the past by Endocrinology at [facility].

## 2024-03-26 ENCOUNTER — NON-APPOINTMENT (OUTPATIENT)
Age: 49
End: 2024-03-26

## 2024-04-04 ENCOUNTER — APPOINTMENT (OUTPATIENT)
Dept: FAMILY MEDICINE | Facility: CLINIC | Age: 49
End: 2024-04-04
Payer: MEDICARE

## 2024-04-04 VITALS
DIASTOLIC BLOOD PRESSURE: 78 MMHG | RESPIRATION RATE: 14 BRPM | OXYGEN SATURATION: 97 % | SYSTOLIC BLOOD PRESSURE: 128 MMHG | WEIGHT: 203 LBS | BODY MASS INDEX: 26.05 KG/M2 | HEIGHT: 74 IN | HEART RATE: 89 BPM

## 2024-04-04 DIAGNOSIS — Z12.12 ENCOUNTER FOR SCREENING FOR MALIGNANT NEOPLASM OF COLON: ICD-10-CM

## 2024-04-04 DIAGNOSIS — Z12.11 ENCOUNTER FOR SCREENING FOR MALIGNANT NEOPLASM OF COLON: ICD-10-CM

## 2024-04-04 DIAGNOSIS — E78.00 PURE HYPERCHOLESTEROLEMIA, UNSPECIFIED: ICD-10-CM

## 2024-04-04 PROCEDURE — 83036 HEMOGLOBIN GLYCOSYLATED A1C: CPT | Mod: QW

## 2024-04-04 PROCEDURE — G2211 COMPLEX E/M VISIT ADD ON: CPT

## 2024-04-04 PROCEDURE — 99214 OFFICE O/P EST MOD 30 MIN: CPT

## 2024-04-04 RX ORDER — INSULIN GLARGINE 100 [IU]/ML
100 INJECTION, SOLUTION SUBCUTANEOUS
Qty: 1 | Refills: 2 | Status: DISCONTINUED | COMMUNITY
Start: 2024-03-04 | End: 2024-04-04

## 2024-04-04 RX ORDER — METFORMIN HYDROCHLORIDE 500 MG/1
500 TABLET, COATED ORAL
Qty: 180 | Refills: 1 | Status: DISCONTINUED | COMMUNITY
Start: 2021-06-03 | End: 2024-04-04

## 2024-04-04 NOTE — ASSESSMENT
[FreeTextEntry1] : HCM: 01/2024 -Colon Cancer screening: GI referral -Immunizations: refused  # Schizophrenia:> Hospitalized at [facility] 1 month. D/C 7/26/22 -On  Abilify -OFF Ziprexa.x 4 days -F/up with Psychiatry Dr Bazan4/23/24  # -DM:Uncontrolled -HbA1c :8.5%--> > 14% --> 11.4% -ON metformin 500mg bid.(intolerant to 1000mg )> change metformin to 500mg ER 2 tabs bid -On  Farxiga 10mg daily. Did not tolerate Rybesius D/C glipizide due to Hypoglycemias> will try Glipizide 5 mg Intolerance to multiple meds: januvia,amaryl and Actos.Refused Invokana, after TV commercial. Seen in the past by Endocrinology at [facility]. Diabetic education CGM education REFUSED any type of injection including Lantus 15 Units qhs F/up in 4 weeks  # Dyslipidemia: -Restart Lipitor.  F/up in 4 weeks.

## 2024-04-04 NOTE — HISTORY OF PRESENT ILLNESS
[FreeTextEntry1] : DM f/up [de-identified] : Medical hx Schizophrenia oFF Zyprexa x 4 days, on Abilify DM not well control, Last HbA1c:>14. On Metformin and Farxiga..Did not start Insulin as indicated. Pt was hospitalized at [facility] for 4 weeks, d/c on 7/26/22 for Bipolar/ Eschizophrenia. Not Checking glucose at home. Pt seen by psychiatry, Dr Bazan  Seen in the past by Endocrinology at [facility].

## 2024-05-08 ENCOUNTER — APPOINTMENT (OUTPATIENT)
Dept: FAMILY MEDICINE | Facility: CLINIC | Age: 49
End: 2024-05-08
Payer: MEDICARE

## 2024-05-08 VITALS
DIASTOLIC BLOOD PRESSURE: 88 MMHG | HEART RATE: 81 BPM | OXYGEN SATURATION: 98 % | WEIGHT: 206 LBS | BODY MASS INDEX: 26.44 KG/M2 | TEMPERATURE: 98 F | RESPIRATION RATE: 14 BRPM | HEIGHT: 74 IN | SYSTOLIC BLOOD PRESSURE: 132 MMHG

## 2024-05-08 PROCEDURE — 99214 OFFICE O/P EST MOD 30 MIN: CPT

## 2024-05-08 PROCEDURE — 83036 HEMOGLOBIN GLYCOSYLATED A1C: CPT | Mod: QW

## 2024-05-08 PROCEDURE — G2211 COMPLEX E/M VISIT ADD ON: CPT

## 2024-05-08 RX ORDER — OLANZAPINE 15 MG/1
15 TABLET ORAL
Refills: 0 | Status: ACTIVE | COMMUNITY
Start: 2024-01-25

## 2024-05-08 NOTE — HISTORY OF PRESENT ILLNESS
[FreeTextEntry1] : DM f/up [de-identified] : Medical hx Schizophrenia on Zyprexa  and Abilify DM not well control, Last HbA1c:>14-->11.5%. On Metformin ,Farxiga and now on Glimepiride 5 mg...Did not start Insulin as indicated. Pt was hospitalized at [facility] for 4 weeks, d/c on 7/26/22 for Bipolar/ Eschizophrenia. Not Checking glucose at home. Pt seen by psychiatry, Dr Bazan  Seen in the past by Endocrinology at [facility].

## 2024-05-08 NOTE — ASSESSMENT
[FreeTextEntry1] : HCM: 01/2024 -Colon Cancer screening: FOBT -Immunizations: refused  # Schizophrenia:> Hospitalized at [facility] 1 month. D/C 7/26/22 -On  Abilify -On Ziprexa. -F/up with Psychiatry Dr Bazan  # -DM: Uncontrolled -HbA1c :8.5%--> > 14% --> 11.4%-->9.5% -ON metformin 500mg bid. (2 tabs in AM and 1 tab PM)> did not tolerate higher dose. -On  Farxiga 10mg daily. Did not tolerate Rybesius -On Glipizide 5 mg> tolerating well> Advise to increase to 10 mg daily. Intolerance to multiple meds: januvia,amaryl and Actos.Refused Invokana, after TV commercial. Seen in the past by Endocrinology at [facility]. Diabetic education CGM education REFUSED any type of injection including Lantus 15 Units qhs F/up in 4 weeks  # Dyslipidemia: -Restart Lipitor.  F/up in 4 weeks.

## 2024-05-12 ENCOUNTER — NON-APPOINTMENT (OUTPATIENT)
Age: 49
End: 2024-05-12

## 2024-05-12 LAB — HEMOCCULT STL QL IA: NEGATIVE

## 2024-05-22 NOTE — ED STATDOCS - OBJECTIVE STATEMENT
45 y/o M pt with hx of bipolar disorder and DM presents to ED c/o sudden numbness in left hand, intermittent left sided, HA since yesterday. Presented to [facility] ED yesterday; had negative CT and US, but left AMA prior to getting MRI because symptoms had improved. He indicates at 18:40 today developed left eye blurry vision (described as a floating black spot), constant left sided HA and left hand numbness and would like to get MRI now. No further complaints at this time.
22-May-2024 10:06

## 2024-07-01 ENCOUNTER — APPOINTMENT (OUTPATIENT)
Dept: FAMILY MEDICINE | Facility: CLINIC | Age: 49
End: 2024-07-01
Payer: MEDICARE

## 2024-07-01 VITALS
HEART RATE: 90 BPM | RESPIRATION RATE: 14 BRPM | DIASTOLIC BLOOD PRESSURE: 78 MMHG | HEIGHT: 74 IN | BODY MASS INDEX: 26.95 KG/M2 | SYSTOLIC BLOOD PRESSURE: 130 MMHG | OXYGEN SATURATION: 99 % | WEIGHT: 210 LBS

## 2024-07-01 DIAGNOSIS — F20.9 SCHIZOPHRENIA, UNSPECIFIED: ICD-10-CM

## 2024-07-01 DIAGNOSIS — E11.9 TYPE 2 DIABETES MELLITUS W/OUT COMPLICATIONS: ICD-10-CM

## 2024-07-01 PROCEDURE — G2211 COMPLEX E/M VISIT ADD ON: CPT

## 2024-07-01 PROCEDURE — 83036 HEMOGLOBIN GLYCOSYLATED A1C: CPT | Mod: QW

## 2024-07-01 PROCEDURE — 99214 OFFICE O/P EST MOD 30 MIN: CPT

## 2024-07-01 RX ORDER — IBUPROFEN 600 MG/1
600 TABLET, FILM COATED ORAL
Qty: 20 | Refills: 0 | Status: DISCONTINUED | COMMUNITY
Start: 2024-04-09

## 2024-07-01 RX ORDER — GLIPIZIDE 5 MG/1
5 TABLET, FILM COATED, EXTENDED RELEASE ORAL
Qty: 180 | Refills: 0 | Status: DISCONTINUED | COMMUNITY
Start: 2024-05-08

## 2024-08-16 ENCOUNTER — APPOINTMENT (OUTPATIENT)
Dept: FAMILY MEDICINE | Facility: CLINIC | Age: 49
End: 2024-08-16

## 2024-11-01 ENCOUNTER — RX RENEWAL (OUTPATIENT)
Age: 49
End: 2024-11-01

## 2025-02-11 NOTE — REVIEW OF SYSTEMS
Constitutional: Well developed, well nourished. NAD  TRAUMA: ABC intact.  Head: Normocephalic, atraumatic.  Eyes: PERRL. EOMI. No Raccoon eyes.   Neck: Supple. Painless ROM. No midline tenderness or stepoffs.  Cardiovascular: Normal S1, S2. Regular rate and rhythm. No murmurs, rubs, or gallops.  Pulmonary: Normal respiratory rate and effort. Lungs clear to auscultation bilaterally. No wheezing, rales, or rhonchi.  CHEST: No chest wall tenderness or crepitus.  Abdominal: Soft. Nondistended. Nontender. No rebound, guarding, or rigidity.  BACK: No midline T/L/S tenderness or stepoffs.  Extremities. Pelvis stable. No traumatic deformities of extremities. + tenderness to palpation left elbow posterior aspect. full range of motion 5/5 strength and sensation intact to left elbow, right shoulder, left knee.  Skin: No rashes, cyanosis, lacerations, or abrasions.  Neuro: AAOx3. Strength 5/5 in all extremities. Sensation intact throughout. No focal neurological deficits.  Psych: Normal mood. Normal affect.
[Negative] : Psychiatric

## 2025-09-03 ENCOUNTER — APPOINTMENT (OUTPATIENT)
Dept: FAMILY MEDICINE | Facility: CLINIC | Age: 50
End: 2025-09-03
Payer: MEDICARE

## 2025-09-03 VITALS
OXYGEN SATURATION: 98 % | RESPIRATION RATE: 14 BRPM | WEIGHT: 198 LBS | HEIGHT: 74 IN | TEMPERATURE: 97.2 F | HEART RATE: 88 BPM | BODY MASS INDEX: 25.41 KG/M2 | SYSTOLIC BLOOD PRESSURE: 130 MMHG | DIASTOLIC BLOOD PRESSURE: 72 MMHG

## 2025-09-03 DIAGNOSIS — E11.9 TYPE 2 DIABETES MELLITUS W/OUT COMPLICATIONS: ICD-10-CM

## 2025-09-03 DIAGNOSIS — Z12.11 ENCOUNTER FOR SCREENING FOR MALIGNANT NEOPLASM OF COLON: ICD-10-CM

## 2025-09-03 DIAGNOSIS — Z00.00 ENCOUNTER FOR GENERAL ADULT MEDICAL EXAMINATION W/OUT ABNORMAL FINDINGS: ICD-10-CM

## 2025-09-03 LAB — HBA1C MFR BLD HPLC: 14.8

## 2025-09-03 PROCEDURE — 83036 HEMOGLOBIN GLYCOSYLATED A1C: CPT | Mod: QW

## 2025-09-03 PROCEDURE — 36415 COLL VENOUS BLD VENIPUNCTURE: CPT

## 2025-09-03 PROCEDURE — G0439: CPT

## 2025-09-04 LAB
ALBUMIN SERPL ELPH-MCNC: 4.4 G/DL
ALP BLD-CCNC: 93 U/L
ALT SERPL-CCNC: 40 U/L
ANION GAP SERPL CALC-SCNC: 13 MMOL/L
AST SERPL-CCNC: 26 U/L
BASOPHILS # BLD AUTO: 0.04 K/UL
BASOPHILS NFR BLD AUTO: 0.6 %
BILIRUB SERPL-MCNC: 0.4 MG/DL
BUN SERPL-MCNC: 14 MG/DL
CALCIUM SERPL-MCNC: 9.6 MG/DL
CHLORIDE SERPL-SCNC: 98 MMOL/L
CHOLEST SERPL-MCNC: 243 MG/DL
CO2 SERPL-SCNC: 25 MMOL/L
CREAT SERPL-MCNC: 0.75 MG/DL
CREAT SPEC-SCNC: 134 MG/DL
CREAT/PROT UR: 0.1 RATIO
EGFRCR SERPLBLD CKD-EPI 2021: 109 ML/MIN/1.73M2
EOSINOPHIL # BLD AUTO: 0.1 K/UL
EOSINOPHIL NFR BLD AUTO: 1.6 %
GLUCOSE SERPL-MCNC: 286 MG/DL
HCT VFR BLD CALC: 46 %
HDLC SERPL-MCNC: 72 MG/DL
HGB BLD-MCNC: 15.2 G/DL
IMM GRANULOCYTES NFR BLD AUTO: 0.2 %
LDLC SERPL-MCNC: 142 MG/DL
LYMPHOCYTES # BLD AUTO: 2.93 K/UL
LYMPHOCYTES NFR BLD AUTO: 46.7 %
MAN DIFF?: NORMAL
MCHC RBC-ENTMCNC: 29.8 PG
MCHC RBC-ENTMCNC: 33 G/DL
MCV RBC AUTO: 90.2 FL
MONOCYTES # BLD AUTO: 0.37 K/UL
MONOCYTES NFR BLD AUTO: 5.9 %
NEUTROPHILS # BLD AUTO: 2.83 K/UL
NEUTROPHILS NFR BLD AUTO: 45 %
NONHDLC SERPL-MCNC: 172 MG/DL
PLATELET # BLD AUTO: 281 K/UL
POTASSIUM SERPL-SCNC: 4.2 MMOL/L
PROT SERPL-MCNC: 7.3 G/DL
PROT UR-MCNC: 17 MG/DL
RBC # BLD: 5.1 M/UL
RBC # FLD: 13.7 %
SODIUM SERPL-SCNC: 136 MMOL/L
TRIGL SERPL-MCNC: 170 MG/DL
TSH SERPL-ACNC: 1.75 UIU/ML
WBC # FLD AUTO: 6.28 K/UL

## 2025-09-05 LAB
APPEARANCE: CLEAR
BILIRUBIN URINE: NEGATIVE
BLOOD URINE: NEGATIVE
COLOR: YELLOW
GLUCOSE QUALITATIVE U: >=1000 MG/DL
KETONES URINE: 15 MG/DL
LEUKOCYTE ESTERASE URINE: NEGATIVE
NITRITE URINE: NEGATIVE
PH URINE: 6.5
PROTEIN URINE: NORMAL MG/DL
SPECIFIC GRAVITY URINE: >1.03
UROBILINOGEN URINE: 1 MG/DL